# Patient Record
Sex: FEMALE | Race: BLACK OR AFRICAN AMERICAN | NOT HISPANIC OR LATINO
[De-identification: names, ages, dates, MRNs, and addresses within clinical notes are randomized per-mention and may not be internally consistent; named-entity substitution may affect disease eponyms.]

---

## 2017-04-04 ENCOUNTER — OTHER (OUTPATIENT)
Age: 82
End: 2017-04-04

## 2017-04-04 ENCOUNTER — APPOINTMENT (OUTPATIENT)
Dept: SURGERY | Facility: CLINIC | Age: 82
End: 2017-04-04

## 2020-11-27 ENCOUNTER — TRANSCRIPTION ENCOUNTER (OUTPATIENT)
Age: 85
End: 2020-11-27

## 2021-07-27 ENCOUNTER — APPOINTMENT (OUTPATIENT)
Dept: ORTHOPEDIC SURGERY | Facility: CLINIC | Age: 86
End: 2021-07-27
Payer: MEDICARE

## 2021-07-27 VITALS
WEIGHT: 189 LBS | BODY MASS INDEX: 31.49 KG/M2 | SYSTOLIC BLOOD PRESSURE: 150 MMHG | DIASTOLIC BLOOD PRESSURE: 76 MMHG | HEIGHT: 65 IN | HEART RATE: 82 BPM

## 2021-07-27 DIAGNOSIS — Z78.9 OTHER SPECIFIED HEALTH STATUS: ICD-10-CM

## 2021-07-27 DIAGNOSIS — M25.532 PAIN IN LEFT WRIST: ICD-10-CM

## 2021-07-27 DIAGNOSIS — M65.4 RADIAL STYLOID TENOSYNOVITIS [DE QUERVAIN]: ICD-10-CM

## 2021-07-27 PROCEDURE — 99203 OFFICE O/P NEW LOW 30 MIN: CPT | Mod: 25

## 2021-07-27 PROCEDURE — 20550 NJX 1 TENDON SHEATH/LIGAMENT: CPT | Mod: LT

## 2021-07-28 PROBLEM — Z78.9 CURRENT NON-SMOKER: Status: ACTIVE | Noted: 2021-07-28

## 2021-09-11 ENCOUNTER — INPATIENT (INPATIENT)
Facility: HOSPITAL | Age: 86
LOS: 1 days | Discharge: HOME | End: 2021-09-13
Attending: EMERGENCY MEDICINE | Admitting: EMERGENCY MEDICINE
Payer: MEDICARE

## 2021-09-11 VITALS
HEART RATE: 75 BPM | TEMPERATURE: 98 F | WEIGHT: 210.1 LBS | OXYGEN SATURATION: 96 % | HEIGHT: 62 IN | DIASTOLIC BLOOD PRESSURE: 74 MMHG | RESPIRATION RATE: 19 BRPM | SYSTOLIC BLOOD PRESSURE: 160 MMHG

## 2021-09-11 DIAGNOSIS — Z96.611 PRESENCE OF RIGHT ARTIFICIAL SHOULDER JOINT: Chronic | ICD-10-CM

## 2021-09-11 DIAGNOSIS — Z96.653 PRESENCE OF ARTIFICIAL KNEE JOINT, BILATERAL: Chronic | ICD-10-CM

## 2021-09-11 LAB
ALBUMIN SERPL ELPH-MCNC: 3.7 G/DL — SIGNIFICANT CHANGE UP (ref 3.3–5)
ALP SERPL-CCNC: 108 U/L — SIGNIFICANT CHANGE UP (ref 40–120)
ALT FLD-CCNC: 292 U/L — HIGH (ref 12–78)
AMYLASE P1 CFR SERPL: 46 U/L — SIGNIFICANT CHANGE UP (ref 25–115)
ANION GAP SERPL CALC-SCNC: 2 MMOL/L — LOW (ref 5–17)
APTT BLD: 57.2 SEC — HIGH (ref 27.5–35.5)
AST SERPL-CCNC: 227 U/L — HIGH (ref 15–37)
BASOPHILS # BLD AUTO: 0.02 K/UL — SIGNIFICANT CHANGE UP (ref 0–0.2)
BASOPHILS NFR BLD AUTO: 0.4 % — SIGNIFICANT CHANGE UP (ref 0–2)
BILIRUB SERPL-MCNC: 1.1 MG/DL — SIGNIFICANT CHANGE UP (ref 0.2–1.2)
BUN SERPL-MCNC: 10 MG/DL — SIGNIFICANT CHANGE UP (ref 7–23)
CALCIUM SERPL-MCNC: 8.9 MG/DL — SIGNIFICANT CHANGE UP (ref 8.5–10.1)
CHLORIDE SERPL-SCNC: 110 MMOL/L — HIGH (ref 96–108)
CO2 SERPL-SCNC: 32 MMOL/L — HIGH (ref 22–31)
CREAT SERPL-MCNC: 0.72 MG/DL — SIGNIFICANT CHANGE UP (ref 0.5–1.3)
EOSINOPHIL # BLD AUTO: 0.17 K/UL — SIGNIFICANT CHANGE UP (ref 0–0.5)
EOSINOPHIL NFR BLD AUTO: 3.3 % — SIGNIFICANT CHANGE UP (ref 0–6)
GLUCOSE SERPL-MCNC: 86 MG/DL — SIGNIFICANT CHANGE UP (ref 70–99)
HCT VFR BLD CALC: 40.7 % — SIGNIFICANT CHANGE UP (ref 34.5–45)
HGB BLD-MCNC: 13.3 G/DL — SIGNIFICANT CHANGE UP (ref 11.5–15.5)
IMM GRANULOCYTES NFR BLD AUTO: 0.2 % — SIGNIFICANT CHANGE UP (ref 0–1.5)
INR BLD: 1.48 RATIO — HIGH (ref 0.88–1.16)
LIDOCAIN IGE QN: 87 U/L — SIGNIFICANT CHANGE UP (ref 73–393)
LYMPHOCYTES # BLD AUTO: 1.24 K/UL — SIGNIFICANT CHANGE UP (ref 1–3.3)
LYMPHOCYTES # BLD AUTO: 23.8 % — SIGNIFICANT CHANGE UP (ref 13–44)
MCHC RBC-ENTMCNC: 31.7 PG — SIGNIFICANT CHANGE UP (ref 27–34)
MCHC RBC-ENTMCNC: 32.7 GM/DL — SIGNIFICANT CHANGE UP (ref 32–36)
MCV RBC AUTO: 97.1 FL — SIGNIFICANT CHANGE UP (ref 80–100)
MONOCYTES # BLD AUTO: 0.48 K/UL — SIGNIFICANT CHANGE UP (ref 0–0.9)
MONOCYTES NFR BLD AUTO: 9.2 % — SIGNIFICANT CHANGE UP (ref 2–14)
NEUTROPHILS # BLD AUTO: 3.28 K/UL — SIGNIFICANT CHANGE UP (ref 1.8–7.4)
NEUTROPHILS NFR BLD AUTO: 63.1 % — SIGNIFICANT CHANGE UP (ref 43–77)
NRBC # BLD: 0 /100 WBCS — SIGNIFICANT CHANGE UP (ref 0–0)
NT-PROBNP SERPL-SCNC: 825 PG/ML — HIGH (ref 0–450)
PLATELET # BLD AUTO: 202 K/UL — SIGNIFICANT CHANGE UP (ref 150–400)
POTASSIUM SERPL-MCNC: 3.4 MMOL/L — LOW (ref 3.5–5.3)
POTASSIUM SERPL-SCNC: 3.4 MMOL/L — LOW (ref 3.5–5.3)
PROT SERPL-MCNC: 7.4 GM/DL — SIGNIFICANT CHANGE UP (ref 6–8.3)
PROTHROM AB SERPL-ACNC: 16.9 SEC — HIGH (ref 10.6–13.6)
RBC # BLD: 4.19 M/UL — SIGNIFICANT CHANGE UP (ref 3.8–5.2)
RBC # FLD: 13.6 % — SIGNIFICANT CHANGE UP (ref 10.3–14.5)
SODIUM SERPL-SCNC: 144 MMOL/L — SIGNIFICANT CHANGE UP (ref 135–145)
TROPONIN I SERPL-MCNC: 0.04 NG/ML — SIGNIFICANT CHANGE UP (ref 0.01–0.04)
WBC # BLD: 5.2 K/UL — SIGNIFICANT CHANGE UP (ref 3.8–10.5)
WBC # FLD AUTO: 5.2 K/UL — SIGNIFICANT CHANGE UP (ref 3.8–10.5)

## 2021-09-11 PROCEDURE — 99285 EMERGENCY DEPT VISIT HI MDM: CPT | Mod: CS

## 2021-09-11 PROCEDURE — 70450 CT HEAD/BRAIN W/O DYE: CPT | Mod: 26,MA

## 2021-09-11 PROCEDURE — 74177 CT ABD & PELVIS W/CONTRAST: CPT | Mod: 26,MA

## 2021-09-11 PROCEDURE — 93010 ELECTROCARDIOGRAM REPORT: CPT

## 2021-09-11 PROCEDURE — 93970 EXTREMITY STUDY: CPT | Mod: 26

## 2021-09-11 PROCEDURE — 99223 1ST HOSP IP/OBS HIGH 75: CPT

## 2021-09-11 PROCEDURE — 71275 CT ANGIOGRAPHY CHEST: CPT | Mod: 26,MA

## 2021-09-11 NOTE — ED PROVIDER NOTE - CONSTITUTIONAL, MLM
Well appearing, awake, alert, oriented to person, place, time/situation and in no apparent distress. Speaking in clear full sentences no nasal flaring no shoulders retractions no diaphoresis not holding her head/chest/abdomen normal...

## 2021-09-11 NOTE — ED ADULT NURSE REASSESSMENT NOTE - NS ED NURSE REASSESS COMMENT FT1
pt accompanied by daughter. pt cleaned and bed linen changed, pt laying comfortably in stretcher, on cardiac monitor. no acute distress noted, will continue to monitor

## 2021-09-11 NOTE — ED PROVIDER NOTE - NSICDXPASTMEDICALHX_GEN_ALL_CORE_FT
PAST MEDICAL HISTORY:  Atrial fibrillation     HTN (hypertension)     Hypercholesteremia     Lesion of lip     Osteoarthritis

## 2021-09-11 NOTE — ED ADULT NURSE NOTE - ED STAT RN HANDOFF DETAILS 2
Report endorsed to oncoming RN diamante. Safety checks compld this shift/Safety rounds completed hourly.  IV sites checked Q2+remains WDL. Meds given as ord with no s/s of adverse RXNs. Fall +skin precs in place. Any issues endorsed to oncoming RN for follow up. pending CT

## 2021-09-11 NOTE — ED PROVIDER NOTE - CARE PLAN
1 Principal Discharge DX:	Abdominal pain   Principal Discharge DX:	Hypoxia  Secondary Diagnosis:	Exertional chest pain

## 2021-09-11 NOTE — ED PROVIDER NOTE - ABDOMINAL TENDER
No Nielson's sign/left upper quadrant/right upper quadrant/left lower quadrant/right lower quadrant/epigastric

## 2021-09-11 NOTE — ED ADULT NURSE NOTE - OBJECTIVE STATEMENT
8Pt presents to ED c/o HA, CP, SOB, abdominal pain, bilateral pedal edema X4 days. +wheezing. SpO2 96% on room air. Cardiac and spo2 monitoring in place. Pt denies fever/chills, N/V/D, palpitations, dizziness. Pt denies sick contacts.

## 2021-09-11 NOTE — ED PROVIDER NOTE - OBJECTIVE STATEMENT
89 years old female here c/o headache, mid chest pain, sob and diffused abd pain intermittently for 4 days Pt has a hx of atrial fib takes Pradaxa. 89 years old female here c/o headache, mid chest pain, sob and diffused abd pain intermittently for 4 days Pt has a hx of atrial fib takes Pradaxa. Pt also c/o bilateral legs swelling and pain. Pt had Moderna covid vaccines. Pt denies recent hx of trauma, dizziness, blurred visions, light sensitivities, focal/distal weakness or numbness, neck/back pain, nausea, vomiting, fever, chills, dysuria or irregular bowel movements.

## 2021-09-11 NOTE — ED ADULT NURSE NOTE - ED STAT RN HANDOFF DETAILS
Report received from CHRISTIANO watkins. Safety checks compld this shift/Safety rounds completed hourly.  IV sites checked Q2+remains WDL. Meds given as ord with no s/s of adverse RXNs. Fall +skin precs in place. on cardiac monitor, daughter at bedside, pt denies pain, no complaints from pt at this time. no acute distress noted, will continue to monitor

## 2021-09-11 NOTE — ED PROVIDER NOTE - EYES, MLM
Clear bilaterally, pupils equal, round and reactive to light. No photophobia bilaterally no sclera ictersu bilaterally

## 2021-09-11 NOTE — ED PROVIDER NOTE - CLINICAL SUMMARY MEDICAL DECISION MAKING FREE TEXT BOX
hx, exam, ekg. pt's ct head/ cta of chest/ ct of abd/pelvis, and care are signed out to the next team

## 2021-09-11 NOTE — ED PROVIDER NOTE - NSICDXPASTSURGICALHX_GEN_ALL_CORE_FT
PAST SURGICAL HISTORY:  History of right shoulder replacement 2013    Status post total bilateral knee replacement 1999

## 2021-09-12 DIAGNOSIS — Z29.9 ENCOUNTER FOR PROPHYLACTIC MEASURES, UNSPECIFIED: ICD-10-CM

## 2021-09-12 DIAGNOSIS — R07.9 CHEST PAIN, UNSPECIFIED: ICD-10-CM

## 2021-09-12 DIAGNOSIS — I10 ESSENTIAL (PRIMARY) HYPERTENSION: ICD-10-CM

## 2021-09-12 DIAGNOSIS — R09.89 OTHER SPECIFIED SYMPTOMS AND SIGNS INVOLVING THE CIRCULATORY AND RESPIRATORY SYSTEMS: ICD-10-CM

## 2021-09-12 DIAGNOSIS — R10.9 UNSPECIFIED ABDOMINAL PAIN: ICD-10-CM

## 2021-09-12 DIAGNOSIS — I48.91 UNSPECIFIED ATRIAL FIBRILLATION: ICD-10-CM

## 2021-09-12 LAB
ALBUMIN SERPL ELPH-MCNC: 3.3 G/DL — SIGNIFICANT CHANGE UP (ref 3.3–5)
ALP SERPL-CCNC: 90 U/L — SIGNIFICANT CHANGE UP (ref 40–120)
ALT FLD-CCNC: 259 U/L — HIGH (ref 12–78)
ANION GAP SERPL CALC-SCNC: 6 MMOL/L — SIGNIFICANT CHANGE UP (ref 5–17)
AST SERPL-CCNC: 159 U/L — HIGH (ref 15–37)
BILIRUB SERPL-MCNC: 1.2 MG/DL — SIGNIFICANT CHANGE UP (ref 0.2–1.2)
BUN SERPL-MCNC: 7 MG/DL — SIGNIFICANT CHANGE UP (ref 7–23)
CALCIUM SERPL-MCNC: 8.7 MG/DL — SIGNIFICANT CHANGE UP (ref 8.5–10.1)
CHLORIDE SERPL-SCNC: 107 MMOL/L — SIGNIFICANT CHANGE UP (ref 96–108)
CK MB BLD-MCNC: 1.4 % — SIGNIFICANT CHANGE UP (ref 0–3.5)
CK MB CFR SERPL CALC: 4.6 NG/ML — HIGH (ref 0.5–3.6)
CK SERPL-CCNC: 322 U/L — HIGH (ref 26–192)
CO2 SERPL-SCNC: 26 MMOL/L — SIGNIFICANT CHANGE UP (ref 22–31)
CREAT SERPL-MCNC: 0.61 MG/DL — SIGNIFICANT CHANGE UP (ref 0.5–1.3)
FLUAV AG NPH QL: SIGNIFICANT CHANGE UP
FLUBV AG NPH QL: SIGNIFICANT CHANGE UP
GLUCOSE SERPL-MCNC: 89 MG/DL — SIGNIFICANT CHANGE UP (ref 70–99)
HAV IGM SER-ACNC: SIGNIFICANT CHANGE UP
HBV CORE IGM SER-ACNC: SIGNIFICANT CHANGE UP
HBV SURFACE AG SER-ACNC: SIGNIFICANT CHANGE UP
HCT VFR BLD CALC: 44.6 % — SIGNIFICANT CHANGE UP (ref 34.5–45)
HCV AB S/CO SERPL IA: 0.14 S/CO — SIGNIFICANT CHANGE UP (ref 0–0.99)
HCV AB SERPL-IMP: SIGNIFICANT CHANGE UP
HGB BLD-MCNC: 14.6 G/DL — SIGNIFICANT CHANGE UP (ref 11.5–15.5)
MCHC RBC-ENTMCNC: 32.2 PG — SIGNIFICANT CHANGE UP (ref 27–34)
MCHC RBC-ENTMCNC: 32.7 GM/DL — SIGNIFICANT CHANGE UP (ref 32–36)
MCV RBC AUTO: 98.5 FL — SIGNIFICANT CHANGE UP (ref 80–100)
NRBC # BLD: 0 /100 WBCS — SIGNIFICANT CHANGE UP (ref 0–0)
PLATELET # BLD AUTO: 200 K/UL — SIGNIFICANT CHANGE UP (ref 150–400)
POTASSIUM SERPL-MCNC: 3.5 MMOL/L — SIGNIFICANT CHANGE UP (ref 3.5–5.3)
POTASSIUM SERPL-SCNC: 3.5 MMOL/L — SIGNIFICANT CHANGE UP (ref 3.5–5.3)
PROT SERPL-MCNC: 7.1 GM/DL — SIGNIFICANT CHANGE UP (ref 6–8.3)
RBC # BLD: 4.53 M/UL — SIGNIFICANT CHANGE UP (ref 3.8–5.2)
RBC # FLD: 13.6 % — SIGNIFICANT CHANGE UP (ref 10.3–14.5)
SARS-COV-2 RNA SPEC QL NAA+PROBE: SIGNIFICANT CHANGE UP
SODIUM SERPL-SCNC: 139 MMOL/L — SIGNIFICANT CHANGE UP (ref 135–145)
TROPONIN I SERPL-MCNC: 0.06 NG/ML — HIGH (ref 0.01–0.04)
TROPONIN I SERPL-MCNC: 0.08 NG/ML — HIGH (ref 0.01–0.04)
WBC # BLD: 5.91 K/UL — SIGNIFICANT CHANGE UP (ref 3.8–10.5)
WBC # FLD AUTO: 5.91 K/UL — SIGNIFICANT CHANGE UP (ref 3.8–10.5)

## 2021-09-12 PROCEDURE — 99232 SBSQ HOSP IP/OBS MODERATE 35: CPT

## 2021-09-12 PROCEDURE — 76700 US EXAM ABDOM COMPLETE: CPT | Mod: 26

## 2021-09-12 RX ORDER — PANTOPRAZOLE SODIUM 20 MG/1
40 TABLET, DELAYED RELEASE ORAL
Refills: 0 | Status: DISCONTINUED | OUTPATIENT
Start: 2021-09-12 | End: 2021-09-13

## 2021-09-12 RX ORDER — AMLODIPINE BESYLATE 2.5 MG/1
5 TABLET ORAL DAILY
Refills: 0 | Status: DISCONTINUED | OUTPATIENT
Start: 2021-09-12 | End: 2021-09-13

## 2021-09-12 RX ORDER — ATORVASTATIN CALCIUM 80 MG/1
40 TABLET, FILM COATED ORAL AT BEDTIME
Refills: 0 | Status: DISCONTINUED | OUTPATIENT
Start: 2021-09-12 | End: 2021-09-13

## 2021-09-12 RX ORDER — FUROSEMIDE 40 MG
20 TABLET ORAL DAILY
Refills: 0 | Status: DISCONTINUED | OUTPATIENT
Start: 2021-09-12 | End: 2021-09-13

## 2021-09-12 RX ORDER — DABIGATRAN ETEXILATE MESYLATE 150 MG/1
150 CAPSULE ORAL EVERY 12 HOURS
Refills: 0 | Status: DISCONTINUED | OUTPATIENT
Start: 2021-09-12 | End: 2021-09-13

## 2021-09-12 RX ORDER — INFLUENZA VIRUS VACCINE 15; 15; 15; 15 UG/.5ML; UG/.5ML; UG/.5ML; UG/.5ML
0.5 SUSPENSION INTRAMUSCULAR ONCE
Refills: 0 | Status: COMPLETED | OUTPATIENT
Start: 2021-09-12 | End: 2021-09-12

## 2021-09-12 RX ORDER — POTASSIUM CHLORIDE 20 MEQ
40 PACKET (EA) ORAL ONCE
Refills: 0 | Status: COMPLETED | OUTPATIENT
Start: 2021-09-12 | End: 2021-09-12

## 2021-09-12 RX ORDER — ASPIRIN/CALCIUM CARB/MAGNESIUM 324 MG
81 TABLET ORAL DAILY
Refills: 0 | Status: DISCONTINUED | OUTPATIENT
Start: 2021-09-12 | End: 2021-09-13

## 2021-09-12 RX ORDER — METOPROLOL TARTRATE 50 MG
25 TABLET ORAL
Refills: 0 | Status: DISCONTINUED | OUTPATIENT
Start: 2021-09-12 | End: 2021-09-13

## 2021-09-12 RX ADMIN — ATORVASTATIN CALCIUM 40 MILLIGRAM(S): 80 TABLET, FILM COATED ORAL at 22:30

## 2021-09-12 RX ADMIN — AMLODIPINE BESYLATE 5 MILLIGRAM(S): 2.5 TABLET ORAL at 05:38

## 2021-09-12 RX ADMIN — DABIGATRAN ETEXILATE MESYLATE 150 MILLIGRAM(S): 150 CAPSULE ORAL at 17:20

## 2021-09-12 RX ADMIN — Medication 40 MILLIEQUIVALENT(S): at 05:38

## 2021-09-12 RX ADMIN — Medication 20 MILLIGRAM(S): at 05:45

## 2021-09-12 RX ADMIN — Medication 81 MILLIGRAM(S): at 11:53

## 2021-09-12 RX ADMIN — DABIGATRAN ETEXILATE MESYLATE 150 MILLIGRAM(S): 150 CAPSULE ORAL at 05:45

## 2021-09-12 RX ADMIN — Medication 25 MILLIGRAM(S): at 05:38

## 2021-09-12 NOTE — CONSULT NOTE ADULT - SUBJECTIVE AND OBJECTIVE BOX
CHIEF COMPLAINT:  Patient is a 89y old  Female who presents with a chief complaint of     HPI:  Patient is a 88 y/o male with PMH of HTN, HLD, osteoarthritis, Afib on Pradaxa p/w chest pain and shortness of breath for 3 days. Pt reports that she has been having non exertion, intermittent,  sub-sternal, non radiating, pressure like chest pain associated with shortness of breath, palpitations, b/l leg swelling. She also reports abdominal pain associated with low appetite, urinary frequency,  denies any nausea, vomiting, fever, chills, urinary discomfort or burning micturition.   In ED, patient's initial VS were stable, saturating 96% on room air.    ALLERGIES:  No Known Allergies        Home Medications:  amLODIPine 5 mg oral tablet: 1 tab(s) orally once a day (12 Sep 2021 04:38)  atorvastatin 40 mg oral tablet: 1 tab(s) orally once a day (12 Sep 2021 04:38)  furosemide 20 mg oral tablet: 1 tab(s) orally once a day (12 Sep 2021 04:38)  Pradaxa 150 mg oral capsule: 1 cap(s) orally 2 times a day (12 Sep 2021 04:38)    PAST MEDICAL & SURGICAL HISTORY:  HTN (hypertension)    Hypercholesteremia    Osteoarthritis    Lesion of lip    Atrial fibrillation    Status post total bilateral knee replacement  1999    History of right shoulder replacement  2013          FAMILY HISTORY:  n/a    SOCIAL HISTORY:  no tobacco use    REVIEW OF SYSTEMS:  General:  No wt loss, fevers, chills, night sweats  Eyes:  Good vision, no reported pain  ENT:  No sore throat, pain, runny nose, dysphagia  CV:  No pain, palpitations, hypo/hypertension  Resp:  No dyspnea, cough, tachypnea, wheezing  GI:  No pain, nausea, vomiting, diarrhea, constipation  :  No pain, bleeding, incontinence, nocturia  Muscle:  No pain, weakness  Breast:  No pain, abscess, mass, discharge  Neuro:  No weakness, tingling, memory problems  Psych:  No fatigue, insomnia, mood problems, depression  Endocrine:  No polyuria, polydipsia, cold/heat intolerance  Heme:  No petechiae, ecchymosis, easy bruisability  Skin:  No rash, edema    PHYSICAL EXAM:  Vital Signs:  Vital Signs Last 24 Hrs  T(C): 36.7 (12 Sep 2021 10:10), Max: 36.8 (11 Sep 2021 20:08)  T(F): 98 (12 Sep 2021 10:10), Max: 98.2 (11 Sep 2021 20:08)  HR: 72 (12 Sep 2021 10:10) (72 - 145)  BP: 115/76 (12 Sep 2021 10:10) (115/76 - 160/93)  RR: 19 (12 Sep 2021 10:10) (18 - 28)  SpO2: 99% (12 Sep 2021 10:10) (92% - 99%)  I&O's Summary    12 Sep 2021 07:01  -  12 Sep 2021 14:22  --------------------------------------------------------  IN: 380 mL / OUT: 0 mL / NET: 380 mL      I&O's Detail    12 Sep 2021 07:01  -  12 Sep 2021 14:22  --------------------------------------------------------  IN:    Oral Fluid: 380 mL  Total IN: 380 mL    OUT:  Total OUT: 0 mL    Total NET: 380 mL          Tele: AF    Constitutional: well developed, normal appearance, well groomed, well nourished, no deformities and no acute distress.   Eyes: the conjunctiva exhibited no abnormalities and the eyelids demonstrated no xanthelasmas.   HEENT: normal oral mucosa, no oral pallor and no oral cyanosis.   Neck: normal jugular venous A waves present, normal jugular venous V waves present and no jugular venous vargas A waves.   Pulmonary: no respiratory distress, normal respiratory rhythm and effort, no accessory muscle use and lungs were clear to auscultation bilaterally.   Cardiovascular: heart rate and rhythm were normal, normal S1 and S2 and no murmur, gallop, rub, heave or thrill are present.   Abdomen: soft, non-tender  Musculoskeletal: the gait could not be assessed.  Extremities: no clubbing of the fingernails, no localized cyanosis, no petechial hemorrhages and no ischemic changes.   Skin: normal skin color and pigmentation, no rash, no venous stasis, no skin lesions, no skin ulcer and no xanthoma was observed.       LABORATORY:                          14.6   5.91  )-----------( 200      ( 12 Sep 2021 09:40 )             44.6     09-12    139  |  107  |  7   ----------------------------<  89  3.5   |  26  |  0.61    Ca    8.7      12 Sep 2021 08:51    TPro  7.1  /  Alb  3.3  /  TBili  1.2  /  DBili  x   /  AST  159<H>  /  ALT  259<H>  /  AlkPhos  90  09-12      CARDIAC MARKERS ( 12 Sep 2021 08:51 )  .076 ng/mL / x     / 322 U/L / x     / 4.6 ng/mL  CARDIAC MARKERS ( 11 Sep 2021 19:20 )  .044 ng/mL / x     / x     / x     / x            LIVER FUNCTIONS - ( 12 Sep 2021 08:51 )  Alb: 3.3 g/dL / Pro: 7.1 gm/dL / ALK PHOS: 90 U/L / ALT: 259 U/L / AST: 159 U/L / GGT: x           PT/INR - ( 11 Sep 2021 19:21 )   PT: 16.9 sec;   INR: 1.48 ratio         PTT - ( 11 Sep 2021 19:21 )  PTT:57.2 sec    BNPSerum Pro-Brain Natriuretic Peptide: 825 pg/mL (09-11-21 @ 19:20)      IMAGING:  < from: 12 Lead ECG (09.11.21 @ 16:18) >  Atrial fibrillation  Left axis deviation  Septal infarct , age undetermined  Abnormal ECG  No previous ECGs available    < end of copied text >    < from: CT Angio Chest PE Protocol w/ IV Cont (09.11.21 @ 21:31) >    No evidence of pulmonary embolism or acute aortic syndrome.    No acute intra-abdominal or pelvic disease.    < end of copied text >    < from: US Abdomen Complete (US Abdomen Complete .) (09.12.21 @ 10:54) >  IMPRESSION:  Mildly distended gallbladder. No definite gallstones delineated.    < end of copied text >      ASSESSMENT:  Patient is a 88 y/o male with PMH of HTN, HLD, osteoarthritis, Afib on Pradaxa p/w chest pain and shortness of breath for 3 days. Pt reports that she has been having non exertion, intermittent,  sub-sternal, non radiating, pressure like chest pain associated with shortness of breath, palpitations, b/l leg swelling. She also reports abdominal pain associated with low appetite, urinary frequency,  denies any nausea, vomiting, fever, chills, urinary discomfort or burning micturition.   In ED, patient's initial VS were stable, saturating 96% on room air.    PLAN:       amLODIPine   Tablet 5 milliGRAM(s) Oral daily  aspirin enteric coated 81 milliGRAM(s) Oral daily  atorvastatin 40 milliGRAM(s) Oral at bedtime  dabigatran 150 milliGRAM(s) Oral every 12 hours  furosemide    Tablet 20 milliGRAM(s) Oral daily  influenza   Vaccine 0.5 milliLiter(s) IntraMuscular once  metoprolol tartrate 25 milliGRAM(s) Oral two times a day  pantoprazole    Tablet 40 milliGRAM(s) Oral before breakfast    c/w DOAC, c/wbb  echo pending    Greg Montes De Oca MD, FACC, FASNICOLE, FASNC, FACP  Director, Heart Failure Services  [facility]  , Department of Cardiology  [facility]     CHIEF COMPLAINT:  Patient is a 89y old  Female who presents with a chief complaint of     HPI:  Patient is a 89 y/o with HTN, HLD, osteoarthritis, Afib on Pradaxa p/w chest pain and shortness of breath for 3 days. Pt reports that she has been having non exertion, intermittent,  sub-sternal, non radiating, pressure like chest pain associated with shortness of breath, palpitations, b/l leg swelling. She also reports abdominal pain associated with low appetite, urinary frequency,  denies any nausea, vomiting, fever, chills, urinary discomfort or burning micturition.   In ED, patient's initial VS were stable, saturating 96% on room air.    ALLERGIES:  No Known Allergies        Home Medications:  amLODIPine 5 mg oral tablet: 1 tab(s) orally once a day (12 Sep 2021 04:38)  atorvastatin 40 mg oral tablet: 1 tab(s) orally once a day (12 Sep 2021 04:38)  furosemide 20 mg oral tablet: 1 tab(s) orally once a day (12 Sep 2021 04:38)  Pradaxa 150 mg oral capsule: 1 cap(s) orally 2 times a day (12 Sep 2021 04:38)    PAST MEDICAL & SURGICAL HISTORY:  HTN (hypertension)    Hypercholesteremia    Osteoarthritis    Lesion of lip    Atrial fibrillation    Status post total bilateral knee replacement  1999    History of right shoulder replacement  2013          FAMILY HISTORY:  n/a    SOCIAL HISTORY:  no tobacco use    REVIEW OF SYSTEMS:  unable    PHYSICAL EXAM:  Vital Signs:  Vital Signs Last 24 Hrs  T(C): 36.7 (12 Sep 2021 10:10), Max: 36.8 (11 Sep 2021 20:08)  T(F): 98 (12 Sep 2021 10:10), Max: 98.2 (11 Sep 2021 20:08)  HR: 72 (12 Sep 2021 10:10) (72 - 145)  BP: 115/76 (12 Sep 2021 10:10) (115/76 - 160/93)  RR: 19 (12 Sep 2021 10:10) (18 - 28)  SpO2: 99% (12 Sep 2021 10:10) (92% - 99%)  I&O's Summary    12 Sep 2021 07:01  -  12 Sep 2021 14:22  --------------------------------------------------------  IN: 380 mL / OUT: 0 mL / NET: 380 mL      I&O's Detail    12 Sep 2021 07:01  -  12 Sep 2021 14:22  --------------------------------------------------------  IN:    Oral Fluid: 380 mL  Total IN: 380 mL    OUT:  Total OUT: 0 mL    Total NET: 380 mL      Tele: AF    Constitutional: well developed, normal appearance, well groomed, well nourished, no deformities and no acute distress.   Eyes: the conjunctiva exhibited no abnormalities and the eyelids demonstrated no xanthelasmas.   HEENT: normal oral mucosa, no oral pallor and no oral cyanosis.   Neck: normal jugular venous A waves present, normal jugular venous V waves present and no jugular venous vargas A waves.   Pulmonary: no respiratory distress, normal respiratory rhythm and effort, no accessory muscle use and lungs were clear to auscultation bilaterally.   Cardiovascular: heart rate and rhythm were normal, normal S1 and S2 and no murmur.  Abdomen: soft, non-tender  Musculoskeletal: the gait could not be assessed.  Extremities: no clubbing of the fingernails, no localized cyanosis, no petechial hemorrhages and no ischemic changes.   Skin: normal skin color and pigmentation, no rash, no venous stasis, no skin lesions, no skin ulcer and no xanthoma was observed.       LABORATORY:                          14.6   5.91  )-----------( 200      ( 12 Sep 2021 09:40 )             44.6     09-12    139  |  107  |  7   ----------------------------<  89  3.5   |  26  |  0.61    Ca    8.7      12 Sep 2021 08:51    TPro  7.1  /  Alb  3.3  /  TBili  1.2  /  DBili  x   /  AST  159<H>  /  ALT  259<H>  /  AlkPhos  90  09-12      CARDIAC MARKERS ( 12 Sep 2021 08:51 )  .076 ng/mL / x     / 322 U/L / x     / 4.6 ng/mL  CARDIAC MARKERS ( 11 Sep 2021 19:20 )  .044 ng/mL / x     / x     / x     / x            LIVER FUNCTIONS - ( 12 Sep 2021 08:51 )  Alb: 3.3 g/dL / Pro: 7.1 gm/dL / ALK PHOS: 90 U/L / ALT: 259 U/L / AST: 159 U/L / GGT: x           PT/INR - ( 11 Sep 2021 19:21 )   PT: 16.9 sec;   INR: 1.48 ratio         PTT - ( 11 Sep 2021 19:21 )  PTT:57.2 sec    BNPSerum Pro-Brain Natriuretic Peptide: 825 pg/mL (09-11-21 @ 19:20)      IMAGING:  < from: 12 Lead ECG (09.11.21 @ 16:18) >  Atrial fibrillation  Left axis deviation  Septal infarct , age undetermined  Abnormal ECG  No previous ECGs available    < end of copied text >    < from: CT Angio Chest PE Protocol w/ IV Cont (09.11.21 @ 21:31) >    No evidence of pulmonary embolism or acute aortic syndrome.    No acute intra-abdominal or pelvic disease.    < end of copied text >    < from: US Abdomen Complete (US Abdomen Complete .) (09.12.21 @ 10:54) >  IMPRESSION:  Mildly distended gallbladder. No definite gallstones delineated.    < end of copied text >      < from: TTE Echo Complete w/o Contrast w/ Doppler (09.12.21 @ 11:03) >  Summary:   1. Left ventricular ejection fraction, by visual estimation, is 50 to 55%.   2. Technically fair study.   3. Normal global left ventricular systolic function.   4. Elevated left ventricular end-diastolic pressure.   5. Normal left ventricular internal cavity size.   6.Spectral Doppler shows pseudonormal pattern of left ventricular myocardial filling (Grade II diastolic dysfunction).   7. There is mild concentric left ventricular hypertrophy.   8. There is no evidence of pericardial effusion.   9. Mild thickening and calcification of the anterior and posterior mitral valve leaflets.  10. Mild to moderate mitral valve regurgitation.  11. Moderate-severe tricuspid regurgitation.  12. Moderate aortic regurgitation.  13. Sclerotic aortic valve with normal opening.  14. Mild pulmonic valve regurgitation.  15. Estimated pulmonary artery systolic pressure is 40.6 mmHg assuming a right atrial pressure of 5 mmHg, which is consistent with mild pulmonary hypertension.    < end of copied text >    ASSESSMENT:  Patient is a 89 y/o male with PMH of HTN, HLD, osteoarthritis, Afib on Pradaxa p/w chest pain and shortness of breath for 3 days. Pt reports that she has been having non exertion, intermittent,  sub-sternal, non radiating, pressure like chest pain associated with shortness of breath, palpitations, b/l leg swelling. She also reports abdominal pain associated with low appetite, urinary frequency,  denies any nausea, vomiting, fever, chills, urinary discomfort or burning micturition.   In ED, patient's initial VS were stable, saturating 96% on room air.    PLAN:       amLODIPine   Tablet 5 milliGRAM(s) Oral daily  aspirin enteric coated 81 milliGRAM(s) Oral daily  atorvastatin 40 milliGRAM(s) Oral at bedtime  dabigatran 150 milliGRAM(s) Oral every 12 hours  furosemide    Tablet 20 milliGRAM(s) Oral daily  metoprolol tartrate 25 milliGRAM(s) Oral two times a day  pantoprazole    Tablet 40 milliGRAM(s) Oral before breakfast    c/w DOAC, c/w bb      Greg Montes De Oca MD, FACC, FASE, FASNC, FACP  Director, Heart Failure Services  [facility]  , Department of Cardiology  [facility]     CHIEF COMPLAINT:  Patient is a 89y old  Female who presents with a chief complaint of     HPI:  Patient is a 88 y/o with HTN, HLD, osteoarthritis, Afib on Pradaxa p/w chest pain and shortness of breath for 3 days. Pt reports that she has been having non exertion, intermittent,  sub-sternal, non radiating, pressure like chest pain associated with shortness of breath, palpitations, b/l leg swelling. She also reports abdominal pain associated with low appetite, urinary frequency,  denies any nausea, vomiting, fever, chills, urinary discomfort or burning micturition.   In ED, patient's initial VS were stable, saturating 96% on room air.    ALLERGIES:  No Known Allergies        Home Medications:  amLODIPine 5 mg oral tablet: 1 tab(s) orally once a day (12 Sep 2021 04:38)  atorvastatin 40 mg oral tablet: 1 tab(s) orally once a day (12 Sep 2021 04:38)  furosemide 20 mg oral tablet: 1 tab(s) orally once a day (12 Sep 2021 04:38)  Pradaxa 150 mg oral capsule: 1 cap(s) orally 2 times a day (12 Sep 2021 04:38)    PAST MEDICAL & SURGICAL HISTORY:  HTN (hypertension)    Hypercholesteremia    Osteoarthritis    Lesion of lip    Atrial fibrillation    Status post total bilateral knee replacement  1999    History of right shoulder replacement  2013          FAMILY HISTORY:  n/a    SOCIAL HISTORY:  no tobacco use    REVIEW OF SYSTEMS:  unable    PHYSICAL EXAM:  Vital Signs:  Vital Signs Last 24 Hrs  T(C): 36.7 (12 Sep 2021 10:10), Max: 36.8 (11 Sep 2021 20:08)  T(F): 98 (12 Sep 2021 10:10), Max: 98.2 (11 Sep 2021 20:08)  HR: 72 (12 Sep 2021 10:10) (72 - 145)  BP: 115/76 (12 Sep 2021 10:10) (115/76 - 160/93)  RR: 19 (12 Sep 2021 10:10) (18 - 28)  SpO2: 99% (12 Sep 2021 10:10) (92% - 99%)  I&O's Summary    12 Sep 2021 07:01  -  12 Sep 2021 14:22  --------------------------------------------------------  IN: 380 mL / OUT: 0 mL / NET: 380 mL      I&O's Detail    12 Sep 2021 07:01  -  12 Sep 2021 14:22  --------------------------------------------------------  IN:    Oral Fluid: 380 mL  Total IN: 380 mL    OUT:  Total OUT: 0 mL    Total NET: 380 mL      Tele: AF    Constitutional: well developed, normal appearance, well groomed, well nourished, no deformities and no acute distress.   Eyes: the conjunctiva exhibited no abnormalities and the eyelids demonstrated no xanthelasmas.   HEENT: normal oral mucosa, no oral pallor and no oral cyanosis.   Neck: normal jugular venous A waves present, normal jugular venous V waves present and no jugular venous vargas A waves.   Pulmonary: no respiratory distress, normal respiratory rhythm and effort, no accessory muscle use and lungs were clear to auscultation bilaterally.   Cardiovascular: heart rate and rhythm were normal, normal S1 and S2 and no murmur.  Abdomen: soft, non-tender  Musculoskeletal: the gait could not be assessed.  Extremities: no clubbing of the fingernails, no localized cyanosis, no petechial hemorrhages and no ischemic changes.   Skin: normal skin color and pigmentation, no rash, no venous stasis, no skin lesions, no skin ulcer and no xanthoma was observed.       LABORATORY:                          14.6   5.91  )-----------( 200      ( 12 Sep 2021 09:40 )             44.6     09-12    139  |  107  |  7   ----------------------------<  89  3.5   |  26  |  0.61    Ca    8.7      12 Sep 2021 08:51    TPro  7.1  /  Alb  3.3  /  TBili  1.2  /  DBili  x   /  AST  159<H>  /  ALT  259<H>  /  AlkPhos  90  09-12      CARDIAC MARKERS ( 12 Sep 2021 08:51 )  .076 ng/mL / x     / 322 U/L / x     / 4.6 ng/mL  CARDIAC MARKERS ( 11 Sep 2021 19:20 )  .044 ng/mL / x     / x     / x     / x            LIVER FUNCTIONS - ( 12 Sep 2021 08:51 )  Alb: 3.3 g/dL / Pro: 7.1 gm/dL / ALK PHOS: 90 U/L / ALT: 259 U/L / AST: 159 U/L / GGT: x           PT/INR - ( 11 Sep 2021 19:21 )   PT: 16.9 sec;   INR: 1.48 ratio         PTT - ( 11 Sep 2021 19:21 )  PTT:57.2 sec    BNPSerum Pro-Brain Natriuretic Peptide: 825 pg/mL (09-11-21 @ 19:20)      IMAGING:  < from: 12 Lead ECG (09.11.21 @ 16:18) >  Atrial fibrillation  Left axis deviation  Septal infarct , age undetermined  Abnormal ECG  No previous ECGs available    < end of copied text >    < from: CT Angio Chest PE Protocol w/ IV Cont (09.11.21 @ 21:31) >    No evidence of pulmonary embolism or acute aortic syndrome.    No acute intra-abdominal or pelvic disease.    < end of copied text >    < from: US Abdomen Complete (US Abdomen Complete .) (09.12.21 @ 10:54) >  IMPRESSION:  Mildly distended gallbladder. No definite gallstones delineated.    < end of copied text >      < from: TTE Echo Complete w/o Contrast w/ Doppler (09.12.21 @ 11:03) >  Summary:   1. Left ventricular ejection fraction, by visual estimation, is 50 to 55%.   2. Technically fair study.   3. Normal global left ventricular systolic function.   4. Elevated left ventricular end-diastolic pressure.   5. Normal left ventricular internal cavity size.   6.Spectral Doppler shows pseudonormal pattern of left ventricular myocardial filling (Grade II diastolic dysfunction).   7. There is mild concentric left ventricular hypertrophy.   8. There is no evidence of pericardial effusion.   9. Mild thickening and calcification of the anterior and posterior mitral valve leaflets.  10. Mild to moderate mitral valve regurgitation.  11. Moderate-severe tricuspid regurgitation.  12. Moderate aortic regurgitation.  13. Sclerotic aortic valve with normal opening.  14. Mild pulmonic valve regurgitation.  15. Estimated pulmonary artery systolic pressure is 40.6 mmHg assuming a right atrial pressure of 5 mmHg, which is consistent with mild pulmonary hypertension.    < end of copied text >    ASSESSMENT:  Patient is a 88 y/o male with PMH of HTN, HLD, osteoarthritis, Afib on Pradaxa p/w chest pain and shortness of breath for 3 days. Pt reports that she has been having non exertion, intermittent,  sub-sternal, non radiating, pressure like chest pain associated with shortness of breath, palpitations, b/l leg swelling. She also reports abdominal pain associated with low appetite, urinary frequency,  denies any nausea, vomiting, fever, chills, urinary discomfort or burning micturition.   In ED, patient's initial VS were stable, saturating 96% on room air.    PLAN:       amLODIPine   Tablet 5 milliGRAM(s) Oral daily  aspirin enteric coated 81 milliGRAM(s) Oral daily  atorvastatin 40 milliGRAM(s) Oral at bedtime  dabigatran 150 milliGRAM(s) Oral every 12 hours  furosemide    Tablet 20 milliGRAM(s) Oral daily  metoprolol tartrate 25 milliGRAM(s) Oral two times a day  pantoprazole    Tablet 40 milliGRAM(s) Oral before breakfast    c/w DOAC, c/w lopressor.  troponin release likely demand ischemia.  no further cardiac work up needed at present.  signing off; please call back if needed.    Greg Montes De Oca MD, FACC, SHIVANI CAMPOS, FACP  Director, Heart Failure Services  [facility]  , Department of Cardiology  [facility]

## 2021-09-12 NOTE — H&P ADULT - PROBLEM SELECTOR PLAN 2
-Pt p/w abdominal pain, no Nausea vomiting.   -P/E: epigastric and RUQ tenderness   -CT abdomen: No evidence of pulmonary embolism or acute aortic syndrome.  No acute intra-abdominal or pelvic disease.  -LFTs noted to be elevated, AST//292   -Follow hepatitis panel, abdominal US for further eval   -Start Protonix

## 2021-09-12 NOTE — H&P ADULT - NSHPPHYSICALEXAM_GEN_ALL_CORE
Vital Signs Last 24 Hrs  T(C): 36.7 (12 Sep 2021 02:53), Max: 36.8 (11 Sep 2021 20:08)  T(F): 98 (12 Sep 2021 02:53), Max: 98.2 (11 Sep 2021 20:08)  HR: 92 (12 Sep 2021 02:53) (75 - 106)  BP: 160/93 (12 Sep 2021 02:53) (122/89 - 160/93)  RR: 25 (12 Sep 2021 02:53) (18 - 28)  SpO2: 92% (12 Sep 2021 02:53) (92% - 98%)    CONSTITUTIONAL: Well appearing, well nourished, awake, alert and in no apparent distress  ENMT: Airway patent, Nasal mucosa clear. Mouth with normal mucosa.   EYES: Clear bilaterally, pupils equal, round and reactive to light.   CARDIAC: Normal rate, regular rhythm.  Heart sounds S1, S2.  No murmurs, rubs or gallops   RESPIRATORY: Breath sounds clear and equal bilaterally. No wheezes, rales or rhonchi  ABDOMEN: soft, non tender, non distended   EXTREMITIES: No edema, cyanosis or deformity   NEUROLOGICAL: Alert and oriented, no focal deficits, no motor or sensory deficits.

## 2021-09-12 NOTE — CHART NOTE - NSCHARTNOTEFT_GEN_A_CORE
continue w current management  cardio c/s has been placed    Vital Signs Last 24 Hrs  T(C): 36.7 (12 Sep 2021 10:10), Max: 36.8 (11 Sep 2021 20:08)  T(F): 98 (12 Sep 2021 10:10), Max: 98.2 (11 Sep 2021 20:08)  HR: 72 (12 Sep 2021 10:10) (72 - 145)  BP: 115/76 (12 Sep 2021 10:10) (115/76 - 160/93)  BP(mean): --  RR: 19 (12 Sep 2021 10:10) (18 - 28)  SpO2: 99% (12 Sep 2021 10:10) (92% - 99%)                            14.6   5.91  )-----------( 200      ( 12 Sep 2021 09:40 )             44.6     09-12    139  |  107  |  7   ----------------------------<  89  3.5   |  26  |  0.61    Ca    8.7      12 Sep 2021 08:51    TPro  7.1  /  Alb  3.3  /  TBili  1.2  /  DBili  x   /  AST  159<H>  /  ALT  259<H>  /  AlkPhos  90  09-12    PT/INR - ( 11 Sep 2021 19:21 )   PT: 16.9 sec;   INR: 1.48 ratio         PTT - ( 11 Sep 2021 19:21 )  PTT:57.2 sec

## 2021-09-12 NOTE — H&P ADULT - ASSESSMENT
Patient is a 89 y/o male with PMH of HTN, HLD, osteoarthritis, Afib on Pradaxa p/w chest pain and shortness of breath for 3 days.  Admitted for ACS r/o

## 2021-09-12 NOTE — H&P ADULT - PROBLEM SELECTOR PLAN 1
-Patient p/w chest pain, sob and palpitations   -EKG: Afib   -CE x 1 negative   -CT Angio chest: No evidence of pulmonary embolism or acute aortic syndrome.  -Start asa, statin, B Blocker   -ECHO, Telemetry  -Cardio consult

## 2021-09-12 NOTE — H&P ADULT - HISTORY OF PRESENT ILLNESS
Patient is a 88 y/o male with PMH of HTN, HLD, osteoarthritis, Afib on Pradaxa p/w chest pain and shortness of breath for 3 days. Pt speaks [place], daughter at bedside helped with translation. Pt reports that she has been having non exertion, intermittent,  sub-sternal, non radiating, pressure like chest pain associated with shortness of breath, palpitations, b/l leg swelling. She also reports abdominal pain associated with low appetite, urinary frequency,  denies any nausea, vomiting, fever, chills, urinary discomfort or burning micturition.     In ED, patient's initial VS were stable, saturating 96% on room air. She was in no apparent distress.

## 2021-09-13 ENCOUNTER — TRANSCRIPTION ENCOUNTER (OUTPATIENT)
Age: 86
End: 2021-09-13

## 2021-09-13 VITALS
SYSTOLIC BLOOD PRESSURE: 126 MMHG | OXYGEN SATURATION: 98 % | DIASTOLIC BLOOD PRESSURE: 72 MMHG | RESPIRATION RATE: 18 BRPM | HEART RATE: 65 BPM | TEMPERATURE: 98 F

## 2021-09-13 LAB
COVID-19 SPIKE DOMAIN AB INTERP: POSITIVE
COVID-19 SPIKE DOMAIN ANTIBODY RESULT: 203 U/ML — HIGH
SARS-COV-2 IGG+IGM SERPL QL IA: 203 U/ML — HIGH
SARS-COV-2 IGG+IGM SERPL QL IA: POSITIVE

## 2021-09-13 PROCEDURE — 99238 HOSP IP/OBS DSCHRG MGMT 30/<: CPT

## 2021-09-13 RX ORDER — METOPROLOL TARTRATE 50 MG
1 TABLET ORAL
Qty: 60 | Refills: 0
Start: 2021-09-13 | End: 2021-10-12

## 2021-09-13 RX ADMIN — AMLODIPINE BESYLATE 5 MILLIGRAM(S): 2.5 TABLET ORAL at 05:28

## 2021-09-13 RX ADMIN — PANTOPRAZOLE SODIUM 40 MILLIGRAM(S): 20 TABLET, DELAYED RELEASE ORAL at 06:12

## 2021-09-13 RX ADMIN — Medication 81 MILLIGRAM(S): at 11:40

## 2021-09-13 RX ADMIN — DABIGATRAN ETEXILATE MESYLATE 150 MILLIGRAM(S): 150 CAPSULE ORAL at 05:28

## 2021-09-13 RX ADMIN — Medication 20 MILLIGRAM(S): at 05:28

## 2021-09-13 RX ADMIN — DABIGATRAN ETEXILATE MESYLATE 150 MILLIGRAM(S): 150 CAPSULE ORAL at 17:34

## 2021-09-13 RX ADMIN — Medication 25 MILLIGRAM(S): at 05:28

## 2021-09-13 RX ADMIN — Medication 25 MILLIGRAM(S): at 17:34

## 2021-09-13 NOTE — DISCHARGE NOTE PROVIDER - NSDCCPCAREPLAN_GEN_ALL_CORE_FT
PRINCIPAL DISCHARGE DIAGNOSIS  Diagnosis: Hypoxia  Assessment and Plan of Treatment:       SECONDARY DISCHARGE DIAGNOSES  Diagnosis: Exertional chest pain  Assessment and Plan of Treatment:      PRINCIPAL DISCHARGE DIAGNOSIS  Diagnosis: Hypoxia  Assessment and Plan of Treatment: Patient is a 88 y/o with HTN, HLD, osteoarthritis, Afib on Pradaxa comes in with complains of chest pain and shortness of breath for 3 days. Pt reports that she has been having non exertion, intermittent,  sub-sternal, non radiating, pressure like chest pain associated with shortness of breath, palpitations, b/l leg swelling. She also reports abdominal pain associated with low appetite, urinary frequency,  denies any nausea, vomiting, fever, chills, urinary discomfort or burning micturition. In ED, patient's initial VS were stable, saturating 96% on room air.  Pt was admitted to Medicine for further cardiac workup with concerns for angina and elevated troponin. During stay, Pt was followed by Cardiology - Dr. Champagne. EKG shows Afib with LAD and septal infarct, age undetermined.  CT, US abdomen unremarkable.  TTE Echo w/ doppler shows  Estimated pulmonary artery systolic pressure is 40.6 mmHg assuming a right atrial pressure of 5 mmHg, which is consistent with mild pulmonary hypertension.  No further cardiac workup needed, c/w home medications and initiate carafate. Pt is Covid positive as of 9/13/2021. Otherwise, hospital course was remarkable with no complications.   Pt was advised to call and make an appointment to follow up with Cardiology- Dr. Montes De Oca in 2 weeks.  Pt was recommended to follow up with PCP for elevated LFTs.         SECONDARY DISCHARGE DIAGNOSES  Diagnosis: Exertional chest pain  Assessment and Plan of Treatment:

## 2021-09-13 NOTE — DISCHARGE NOTE PROVIDER - HOSPITAL COURSE
Patient is a 88 y/o with HTN, HLD, osteoarthritis, Afib on Pradaxa comes in with complains of chest pain and shortness of breath for 3 days. Pt reports that she has been having non exertion, intermittent,  sub-sternal, non radiating, pressure like chest pain associated with shortness of breath, palpitations, b/l leg swelling. She also reports abdominal pain associated with low appetite, urinary frequency,  denies any nausea, vomiting, fever, chills, urinary discomfort or burning micturition. In ED, patient's initial VS were stable, saturating 96% on room air.    Pt was admitted to Medicine for further cardiac workup with concerns for angina and elevated troponin. During stay, Pt was followed by Cardiology - Dr. Champagne. EKG shows Afib with LAD and septal infarct, age undetermined.  CT, US abdomen unremarkable.  TTE Echo w/ doppler shows  Estimated pulmonary artery systolic pressure is 40.6 mmHg assuming a right atrial pressure of 5 mmHg, which is consistent with mild pulmonary hypertension.  No further cardiac workup needed, c/w home medications and initiate carafate. Pt is Covid positive as of 9/13/2021. Otherwise, hospital course was remarkable with no complications.     Pt was advised to call and make an appointment to follow up with Cardiology- Dr. Montes De Oca in 2 weeks.  Pt was recommended to follow up with PCP for elevated LFTs.

## 2021-09-13 NOTE — PROGRESS NOTE ADULT - SUBJECTIVE AND OBJECTIVE BOX
Patient is a 89y old  Female who presents with a chief complaint of     SUBJECTIVE: Igbo interpretor @ [id number] used. reports still having pinching chest pain but no other symptoms   INTERVAL HPI/OVERNIGHT EVENTS: overnight no acute events     MEDICATIONS  (STANDING):  amLODIPine   Tablet 5 milliGRAM(s) Oral daily  aspirin enteric coated 81 milliGRAM(s) Oral daily  atorvastatin 40 milliGRAM(s) Oral at bedtime  dabigatran 150 milliGRAM(s) Oral every 12 hours  furosemide    Tablet 20 milliGRAM(s) Oral daily  influenza   Vaccine 0.5 milliLiter(s) IntraMuscular once  metoprolol tartrate 25 milliGRAM(s) Oral two times a day  pantoprazole    Tablet 40 milliGRAM(s) Oral before breakfast    MEDICATIONS  (PRN):    Allergies    No Known Allergies    Intolerances      REVIEW OF SYSTEMS:  All other systems reviewed and are negative    Vital Signs Last 24 Hrs  T(C): 36.5 (13 Sep 2021 05:10), Max: 36.8 (12 Sep 2021 15:29)  T(F): 97.7 (13 Sep 2021 05:10), Max: 98.2 (12 Sep 2021 15:29)  HR: 92 (13 Sep 2021 05:10) (81 - 92)  BP: 135/90 (13 Sep 2021 05:10) (123/74 - 135/90)  BP(mean): --  RR: 17 (13 Sep 2021 05:10) (17 - 18)  SpO2: 94% (13 Sep 2021 05:10) (94% - 97%)  Daily     Daily   I&O's Summary    12 Sep 2021 07:01  -  13 Sep 2021 07:00  --------------------------------------------------------  IN: 854 mL / OUT: 0 mL / NET: 854 mL    13 Sep 2021 07:01  -  13 Sep 2021 10:48  --------------------------------------------------------  IN: 320 mL / OUT: 0 mL / NET: 320 mL      CAPILLARY BLOOD GLUCOSE        PHYSICAL EXAM:  GENERAL: NAD, well-groomed, well-developed  HEAD:  Atraumatic, Normocephalic  EYES: EOMI, PERRLA, conjunctiva and sclera clear  ENMT: No tonsillar erythema, exudates, or enlargement; Moist mucous membranes, Good dentition, No lesions  NECK: Supple, No JVD, Normal thyroid  NERVOUS SYSTEM:  Alert & Oriented X3, Good concentration; Motor Strength 5/5 B/L upper and lower extremities; DTRs 2+ intact and symmetric  CHEST/LUNG: Clear to percussion bilaterally; No rales, rhonchi, wheezing, or rubs  HEART: Regular rate and rhythm; No murmurs, rubs, or gallops  ABDOMEN: Soft, Nontender, Nondistended; Bowel sounds present  EXTREMITIES:  2+ Peripheral Pulses, No clubbing, cyanosis, or edema  LYMPH: No lymphadenopathy noted  SKIN: No rashes or lesions    Labs                          14.6   5.91  )-----------( 200      ( 12 Sep 2021 09:40 )             44.6     09-12    139  |  107  |  7   ----------------------------<  89  3.5   |  26  |  0.61    Ca    8.7      12 Sep 2021 08:51    TPro  7.1  /  Alb  3.3  /  TBili  1.2  /  DBili  x   /  AST  159<H>  /  ALT  259<H>  /  AlkPhos  90  09-12    PT/INR - ( 11 Sep 2021 19:21 )   PT: 16.9 sec;   INR: 1.48 ratio         PTT - ( 11 Sep 2021 19:21 )  PTT:57.2 sec  CARDIAC MARKERS ( 12 Sep 2021 16:39 )  .062 ng/mL / x     / x     / x     / x      CARDIAC MARKERS ( 12 Sep 2021 08:51 )  .076 ng/mL / x     / 322 U/L / x     / 4.6 ng/mL  CARDIAC MARKERS ( 11 Sep 2021 19:20 )  .044 ng/mL / x     / x     / x     / x                        DVT prophylaxis: > Lovenox 40mg SQ daily  > Heparin   > SCD's

## 2021-09-13 NOTE — DISCHARGE NOTE NURSING/CASE MANAGEMENT/SOCIAL WORK - PATIENT PORTAL LINK FT
You can access the FollowMyHealth Patient Portal offered by [facility] by registering at the following website: [email or web address]. By joining [employer]’s FollowMyHealth portal, you will also be able to view your health information using other applications (apps) compatible with our system.

## 2021-09-13 NOTE — PROGRESS NOTE ADULT - ASSESSMENT
89 yo female w/ pmhx of HTN, HLD, osteoarthritis, afib on pradaxa admitted to [facility] for chest pain, concerning for angina  - CT abd/pelvis - no acute abdomina  - CTA- no PE  - physical examination unremarkable  - EKG shows afib, septal infarct, age indeterminate  - troponin mildly elevated   PLAN  - c/w TELE, cardiac monitoring  - c/w home medications  - start carafate   - consult CARDIOLOGY

## 2021-09-13 NOTE — DISCHARGE NOTE PROVIDER - CARE PROVIDER_API CALL
Greg Montes De Oca)  Medicine  Cardiology  [address] [place] [address]  Phone: [phone]  Fax: [phone]  Follow Up Time: 2 weeks

## 2021-09-13 NOTE — DISCHARGE NOTE PROVIDER - NSDCMRMEDTOKEN_GEN_ALL_CORE_FT
amLODIPine 5 mg oral tablet: 1 tab(s) orally once a day  atorvastatin 40 mg oral tablet: 1 tab(s) orally once a day  furosemide 20 mg oral tablet: 1 tab(s) orally once a day  Pradaxa 150 mg oral capsule: 1 cap(s) orally 2 times a day   amLODIPine 5 mg oral tablet: 1 tab(s) orally once a day  aspirin 81 mg oral delayed release tablet: 1 tab(s) orally once a day  atorvastatin 40 mg oral tablet: 1 tab(s) orally once a day  furosemide 20 mg oral tablet: 1 tab(s) orally once a day  metoprolol tartrate 25 mg oral tablet: 1 tab(s) orally 2 times a day  pantoprazole 40 mg oral delayed release tablet: 1 tab(s) orally once a day (before a meal)  Pradaxa 150 mg oral capsule: 1 cap(s) orally 2 times a day

## 2021-09-22 DIAGNOSIS — K13.0 DISEASES OF LIPS: ICD-10-CM

## 2021-09-22 DIAGNOSIS — Z96.653 PRESENCE OF ARTIFICIAL KNEE JOINT, BILATERAL: ICD-10-CM

## 2021-09-22 DIAGNOSIS — I10 ESSENTIAL (PRIMARY) HYPERTENSION: ICD-10-CM

## 2021-09-22 DIAGNOSIS — I48.91 UNSPECIFIED ATRIAL FIBRILLATION: ICD-10-CM

## 2021-09-22 DIAGNOSIS — E78.5 HYPERLIPIDEMIA, UNSPECIFIED: ICD-10-CM

## 2021-09-22 DIAGNOSIS — I27.20 PULMONARY HYPERTENSION, UNSPECIFIED: ICD-10-CM

## 2021-09-22 DIAGNOSIS — I24.8 OTHER FORMS OF ACUTE ISCHEMIC HEART DISEASE: ICD-10-CM

## 2021-09-22 DIAGNOSIS — M19.91 PRIMARY OSTEOARTHRITIS, UNSPECIFIED SITE: ICD-10-CM

## 2021-09-22 DIAGNOSIS — R07.9 CHEST PAIN, UNSPECIFIED: ICD-10-CM

## 2021-09-22 DIAGNOSIS — R09.02 HYPOXEMIA: ICD-10-CM

## 2021-09-22 DIAGNOSIS — Z79.01 LONG TERM (CURRENT) USE OF ANTICOAGULANTS: ICD-10-CM

## 2021-09-26 ENCOUNTER — INPATIENT (INPATIENT)
Facility: HOSPITAL | Age: 86
LOS: 3 days | Discharge: HOME | End: 2021-09-30
Attending: INTERNAL MEDICINE | Admitting: INTERNAL MEDICINE
Payer: MEDICARE

## 2021-09-26 VITALS — WEIGHT: 207.01 LBS | HEIGHT: 62 IN

## 2021-09-26 DIAGNOSIS — Z96.611 PRESENCE OF RIGHT ARTIFICIAL SHOULDER JOINT: Chronic | ICD-10-CM

## 2021-09-26 DIAGNOSIS — Z96.653 PRESENCE OF ARTIFICIAL KNEE JOINT, BILATERAL: Chronic | ICD-10-CM

## 2021-09-26 PROCEDURE — 93010 ELECTROCARDIOGRAM REPORT: CPT

## 2021-09-26 PROCEDURE — 99291 CRITICAL CARE FIRST HOUR: CPT

## 2021-09-26 NOTE — ED ADULT NURSE NOTE - NSIMPLEMENTINTERV_GEN_ALL_ED
Implemented All Fall Risk Interventions:  [place] to call system. Call bell, personal items and telephone within reach. Instruct patient to call for assistance. Room bathroom lighting operational. Non-slip footwear when patient is off stretcher. Physically safe environment: no spills, clutter or unnecessary equipment. Stretcher in lowest position, wheels locked, appropriate side rails in place. Provide visual cue, wrist band, yellow gown, etc. Monitor gait and stability. Monitor for mental status changes and reorient to person, place, and time. Review medications for side effects contributing to fall risk. Reinforce activity limits and safety measures with patient and family.

## 2021-09-26 NOTE — ED ADULT NURSE NOTE - ED STAT RN HANDOFF DETAILS
Report given to receiving RN April, pts history, current condition and reason for admission discussed, safety concerns addressed and reviewed, pt currently in stable condition, IV flushes for patency and site shows no signs or symptoms of infiltrate, dressing is clean dry and intact, pt is aware of plan of care. Pt education deemed successful at time of report after patient demonstrates successful teach back for proficiency.

## 2021-09-26 NOTE — ED ADULT NURSE NOTE - CAS TRG GENERAL NORM CIRC DET
Infectious Diseases Daily Progress Note   [facility]     Patient's Name: Marsha Puckett   Date of Service: 5/5/2021     Date of admission: 2/8/2021                Hospital Day: 87  Principal Diagnosis:                             Marsha Puckett who is a 41 year old female admitted 2/8/2021  8:10 PM with   Scheduled Medications   Reviewed   Past Medical History, Social History, Medications and Allergies reviewed.   Reviewed Pertinent: Laboratory studies, radiographic studies, medications, and recent progress notes.     Subjective:   Afebrile.  No new complaints. Poor oral intake    Objective:    VITAL SIGNS:  98.4, 98, 14, 108/53  Physical examination:  General : Awake, NAD, appears comfortable, obese  Head:  PERRL, No icterus, no oral thrush  Neck : supple    Pulm: Clear to auscultation, no wheezes, no rales  GI: Abdomen is soft , midline abdominal wound is covered with dressing.    Left added ostomy bag has semi solid brown stool  : + gray   CVS:  Pulse regular, S1, S2 normal, no m/g/r   Ext:   BLE pitting edema  Skin: No rashes  Lines:   right upper chest MediPort site appears unremarkable  MSK: No major joint swelling , pain, erythema, effusion.      Laboratory data, microbiology, imaging:    Recent Labs   Lab 05/05/21  0503 05/04/21  0440 05/03/21  0433   WBC 10.3 10.0 9.6   HGB 6.9* 7.0* 6.3*   HCT 22.5* 22.5* 19.7*   PLT 54* 60* 73*     Recent Labs   Lab 05/05/21  0503 05/04/21  0440 05/03/21  0433   SODIUM 133* 133* 134*   POTASSIUM 4.0 3.5 3.6   BUN 14 16 13   CREATININE 0.99* 1.02* 1.07*   GLUCOSE 96 100* 97   CALCIUM 8.1* 8.1* 8.0*   ALBUMIN 1.7*  --   --    AST 21  --   --    GPT 28  --   --    ALKPT 166*  --   --    BILIRUBIN 0.2  --   --      Urinalysis on 04/11/2021 shows 6-10 wbc's, more than 100 RBCs (UA was obtained while patient was having hematuria and CBI)     Results for MARSHA PUCKETT (MRN [id number]) as of 3/5/2021 11:22   Ref. Range 3/4/2021 17:00    ERYTHROCYTES, URINALYSIS Latest Ref Range: None Seen, 1 to 2 /hpf 11 to 25 (A)   LEUKOCYTES, URINALYSIS Latest Ref Range: None Seen, 1 to 5 /hpf 26 to 100 (A)       COVID-19 PCR was negative on 12/16/2020    Microbiology:  Blood cultures from 12/16/2020 are negative  Bacterial culture from 12/16/20 labeled as abdominal fluid is positive for Clostridium perfringens  Urine cx 3/4/21 + citrobacter  Blood cultures from 04/11/2021 are positive for Gram-negative bacilli    Radiology/Imaging:   Reviewed         Assessment:     Enterobacter aerogenes and Klebsiella bacteremia on the basis of positive blood culture from 04/11/2021 .  Suspect PICC line infection.  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 Patient has indwelling ureteral stents     Vaginal discharge , unknown etiology. Suspect tissue sloughing from radiation damage. 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  Pathology results from 03/29/21 are negative for any evidence of malignancy    Bilateral hydronephrosis, gross hematuria status post cystoscopy, bilateral retrograde pyelogram, bilateral ureteral stent placement, clot evacuation with fulguration on 03/20/2021    Intra abd abscess  (CT abdomen 3/4/21)  IR consulted 3/5/21 but unable to place a drain.  Aspirate culture was negative.    Perforated viscus causing acute bacterial peritonitis complicated by multiple intra-abdominal abscesses s/p exp lap, sigmoid and rectal resection, transverse loop colostomy, lysis of adhesion, drainage of retroperitoneal abscesses on 12/17/2020.  Status post IR drain placement 01/20/2021, exchange in upsizing on 01/27/2021. All drains removed prior to discharge from [facility]. Intra-abdominal abscesses have been resolved as far as we can tell. abx stopped  1/30/21      Left leg acute extensive DVT status post thrombectomy and IVC filter placement  Severe protein calorie malnutrition, hypoalbuminemia  Metastatic cervical  cancer status post chemotherapy and radiation, under the care Dr. Lebron  Left-sided hydronephrosis.  Severe protein calorie malnutrition hypoalbuminemia  Metastatic cervical cancer s/p chemotherapy and radiation, under the care Dr. Lebron  Chronic kidney disease     Plan & Recommendations:     Completed antibiotic therapy and repeat blood cultures neg.   Encourage PO   Call me if any fevers  No isolation        BALJINDER Curran MD  Infectious Diseases  [phone] (P)   [phone] (O)  5/5/2021     10:29 AM      Strong peripheral pulses

## 2021-09-27 DIAGNOSIS — Z79.82 LONG TERM (CURRENT) USE OF ASPIRIN: ICD-10-CM

## 2021-09-27 PROBLEM — I48.91 UNSPECIFIED ATRIAL FIBRILLATION: Chronic | Status: ACTIVE | Noted: 2021-09-11

## 2021-09-27 LAB
A1C WITH ESTIMATED AVERAGE GLUCOSE RESULT: 5.8 % — HIGH (ref 4–5.6)
ALBUMIN SERPL ELPH-MCNC: 3.1 G/DL — LOW (ref 3.3–5)
ALP SERPL-CCNC: 109 U/L — SIGNIFICANT CHANGE UP (ref 40–120)
ALT FLD-CCNC: 158 U/L — HIGH (ref 12–78)
ANION GAP SERPL CALC-SCNC: 4 MMOL/L — LOW (ref 5–17)
APPEARANCE UR: CLEAR — SIGNIFICANT CHANGE UP
APTT BLD: 41.9 SEC — HIGH (ref 27.5–35.5)
AST SERPL-CCNC: 102 U/L — HIGH (ref 15–37)
BACTERIA # UR AUTO: ABNORMAL
BASE EXCESS BLDA CALC-SCNC: 2.1 MMOL/L — HIGH (ref -2–2)
BASOPHILS # BLD AUTO: 0.02 K/UL — SIGNIFICANT CHANGE UP (ref 0–0.2)
BASOPHILS NFR BLD AUTO: 0.4 % — SIGNIFICANT CHANGE UP (ref 0–2)
BILIRUB SERPL-MCNC: 0.8 MG/DL — SIGNIFICANT CHANGE UP (ref 0.2–1.2)
BILIRUB UR-MCNC: NEGATIVE — SIGNIFICANT CHANGE UP
BLOOD GAS COMMENTS: SIGNIFICANT CHANGE UP
BLOOD GAS SOURCE: SIGNIFICANT CHANGE UP
BUN SERPL-MCNC: 14 MG/DL — SIGNIFICANT CHANGE UP (ref 7–23)
CALCIUM SERPL-MCNC: 8.2 MG/DL — LOW (ref 8.5–10.1)
CHLORIDE SERPL-SCNC: 109 MMOL/L — HIGH (ref 96–108)
CO2 SERPL-SCNC: 28 MMOL/L — SIGNIFICANT CHANGE UP (ref 22–31)
COLOR SPEC: YELLOW — SIGNIFICANT CHANGE UP
CREAT SERPL-MCNC: 1.03 MG/DL — SIGNIFICANT CHANGE UP (ref 0.5–1.3)
DIFF PNL FLD: ABNORMAL
EOSINOPHIL # BLD AUTO: 0.31 K/UL — SIGNIFICANT CHANGE UP (ref 0–0.5)
EOSINOPHIL NFR BLD AUTO: 5.5 % — SIGNIFICANT CHANGE UP (ref 0–6)
EPI CELLS # UR: ABNORMAL
ESTIMATED AVERAGE GLUCOSE: 120 MG/DL — HIGH (ref 68–114)
FLUAV AG NPH QL: SIGNIFICANT CHANGE UP
FLUBV AG NPH QL: SIGNIFICANT CHANGE UP
GLUCOSE SERPL-MCNC: 180 MG/DL — HIGH (ref 70–99)
GLUCOSE UR QL: NEGATIVE MG/DL — SIGNIFICANT CHANGE UP
GRAN CASTS # UR COMP ASSIST: ABNORMAL /LPF
HCO3 BLDA-SCNC: 27 MMOL/L — SIGNIFICANT CHANGE UP (ref 21–29)
HCT VFR BLD CALC: 37.1 % — SIGNIFICANT CHANGE UP (ref 34.5–45)
HGB BLD-MCNC: 12.1 G/DL — SIGNIFICANT CHANGE UP (ref 11.5–15.5)
HOROWITZ INDEX BLDA+IHG-RTO: 0.4 — SIGNIFICANT CHANGE UP
HYALINE CASTS # UR AUTO: ABNORMAL /LPF
IMM GRANULOCYTES NFR BLD AUTO: 0 % — SIGNIFICANT CHANGE UP (ref 0–1.5)
INR BLD: 1.51 RATIO — HIGH (ref 0.88–1.16)
KETONES UR-MCNC: NEGATIVE — SIGNIFICANT CHANGE UP
LEUKOCYTE ESTERASE UR-ACNC: ABNORMAL
LYMPHOCYTES # BLD AUTO: 1.04 K/UL — SIGNIFICANT CHANGE UP (ref 1–3.3)
LYMPHOCYTES # BLD AUTO: 18.6 % — SIGNIFICANT CHANGE UP (ref 13–44)
MCHC RBC-ENTMCNC: 32 PG — SIGNIFICANT CHANGE UP (ref 27–34)
MCHC RBC-ENTMCNC: 32.6 GM/DL — SIGNIFICANT CHANGE UP (ref 32–36)
MCV RBC AUTO: 98.1 FL — SIGNIFICANT CHANGE UP (ref 80–100)
MONOCYTES # BLD AUTO: 0.62 K/UL — SIGNIFICANT CHANGE UP (ref 0–0.9)
MONOCYTES NFR BLD AUTO: 11.1 % — SIGNIFICANT CHANGE UP (ref 2–14)
NEUTROPHILS # BLD AUTO: 3.6 K/UL — SIGNIFICANT CHANGE UP (ref 1.8–7.4)
NEUTROPHILS NFR BLD AUTO: 64.4 % — SIGNIFICANT CHANGE UP (ref 43–77)
NITRITE UR-MCNC: NEGATIVE — SIGNIFICANT CHANGE UP
NRBC # BLD: 0 /100 WBCS — SIGNIFICANT CHANGE UP (ref 0–0)
NT-PROBNP SERPL-SCNC: 2244 PG/ML — HIGH (ref 0–450)
PCO2 BLDA: 43 MMHG — SIGNIFICANT CHANGE UP (ref 32–46)
PH BLD: 7.41 — SIGNIFICANT CHANGE UP (ref 7.35–7.45)
PH UR: 5 — SIGNIFICANT CHANGE UP (ref 5–8)
PLATELET # BLD AUTO: 190 K/UL — SIGNIFICANT CHANGE UP (ref 150–400)
PO2 BLDA: 166 MMHG — HIGH (ref 74–108)
POTASSIUM SERPL-MCNC: 3.9 MMOL/L — SIGNIFICANT CHANGE UP (ref 3.5–5.3)
POTASSIUM SERPL-SCNC: 3.9 MMOL/L — SIGNIFICANT CHANGE UP (ref 3.5–5.3)
PROT SERPL-MCNC: 6.5 GM/DL — SIGNIFICANT CHANGE UP (ref 6–8.3)
PROT UR-MCNC: 100 MG/DL
PROTHROM AB SERPL-ACNC: 17.2 SEC — HIGH (ref 10.6–13.6)
RBC # BLD: 3.78 M/UL — LOW (ref 3.8–5.2)
RBC # FLD: 13.3 % — SIGNIFICANT CHANGE UP (ref 10.3–14.5)
RBC CASTS # UR COMP ASSIST: SIGNIFICANT CHANGE UP /HPF (ref 0–4)
SAO2 % BLDA: 99 % — HIGH (ref 92–96)
SARS-COV-2 RNA SPEC QL NAA+PROBE: SIGNIFICANT CHANGE UP
SODIUM SERPL-SCNC: 141 MMOL/L — SIGNIFICANT CHANGE UP (ref 135–145)
SP GR SPEC: 1.02 — SIGNIFICANT CHANGE UP (ref 1.01–1.02)
TROPONIN I SERPL-MCNC: 0.03 NG/ML — SIGNIFICANT CHANGE UP (ref 0.01–0.04)
TROPONIN I SERPL-MCNC: 0.04 NG/ML — SIGNIFICANT CHANGE UP (ref 0.01–0.04)
UROBILINOGEN FLD QL: 1 MG/DL
WBC # BLD: 5.59 K/UL — SIGNIFICANT CHANGE UP (ref 3.8–10.5)
WBC # FLD AUTO: 5.59 K/UL — SIGNIFICANT CHANGE UP (ref 3.8–10.5)
WBC UR QL: SIGNIFICANT CHANGE UP

## 2021-09-27 PROCEDURE — 74176 CT ABD & PELVIS W/O CONTRAST: CPT | Mod: 26

## 2021-09-27 PROCEDURE — 71045 X-RAY EXAM CHEST 1 VIEW: CPT | Mod: 26

## 2021-09-27 PROCEDURE — 99223 1ST HOSP IP/OBS HIGH 75: CPT

## 2021-09-27 RX ORDER — FUROSEMIDE 40 MG
40 TABLET ORAL
Refills: 0 | Status: DISCONTINUED | OUTPATIENT
Start: 2021-09-27 | End: 2021-09-28

## 2021-09-27 RX ORDER — DABIGATRAN ETEXILATE MESYLATE 150 MG/1
150 CAPSULE ORAL EVERY 12 HOURS
Refills: 0 | Status: DISCONTINUED | OUTPATIENT
Start: 2021-09-27 | End: 2021-09-30

## 2021-09-27 RX ORDER — FAMOTIDINE 10 MG/ML
40 INJECTION INTRAVENOUS ONCE
Refills: 0 | Status: COMPLETED | OUTPATIENT
Start: 2021-09-27 | End: 2021-09-27

## 2021-09-27 RX ORDER — CEFTRIAXONE 500 MG/1
1000 INJECTION, POWDER, FOR SOLUTION INTRAMUSCULAR; INTRAVENOUS ONCE
Refills: 0 | Status: COMPLETED | OUTPATIENT
Start: 2021-09-27 | End: 2021-09-27

## 2021-09-27 RX ORDER — SUCRALFATE 1 G
1 TABLET ORAL
Refills: 0 | Status: DISCONTINUED | OUTPATIENT
Start: 2021-09-27 | End: 2021-09-30

## 2021-09-27 RX ORDER — PANTOPRAZOLE SODIUM 20 MG/1
40 TABLET, DELAYED RELEASE ORAL
Refills: 0 | Status: DISCONTINUED | OUTPATIENT
Start: 2021-09-27 | End: 2021-09-30

## 2021-09-27 RX ORDER — POLYETHYLENE GLYCOL 3350 17 G/17G
17 POWDER, FOR SOLUTION ORAL DAILY
Refills: 0 | Status: DISCONTINUED | OUTPATIENT
Start: 2021-09-27 | End: 2021-09-30

## 2021-09-27 RX ORDER — ATORVASTATIN CALCIUM 80 MG/1
40 TABLET, FILM COATED ORAL AT BEDTIME
Refills: 0 | Status: DISCONTINUED | OUTPATIENT
Start: 2021-09-27 | End: 2021-09-30

## 2021-09-27 RX ORDER — LIDOCAINE 4 G/100G
10 CREAM TOPICAL ONCE
Refills: 0 | Status: COMPLETED | OUTPATIENT
Start: 2021-09-27 | End: 2021-09-27

## 2021-09-27 RX ORDER — DABIGATRAN ETEXILATE MESYLATE 150 MG/1
1 CAPSULE ORAL
Qty: 0 | Refills: 0 | DISCHARGE

## 2021-09-27 RX ORDER — AMLODIPINE BESYLATE 2.5 MG/1
5 TABLET ORAL DAILY
Refills: 0 | Status: DISCONTINUED | OUTPATIENT
Start: 2021-09-27 | End: 2021-09-30

## 2021-09-27 RX ORDER — METOPROLOL TARTRATE 50 MG
25 TABLET ORAL
Refills: 0 | Status: DISCONTINUED | OUTPATIENT
Start: 2021-09-27 | End: 2021-09-30

## 2021-09-27 RX ORDER — BENZOCAINE 10 %
1 GEL (GRAM) MUCOUS MEMBRANE
Qty: 2 | Refills: 0
Start: 2021-09-27 | End: 2021-09-29

## 2021-09-27 RX ORDER — BENZOCAINE 10 %
1 GEL (GRAM) MUCOUS MEMBRANE
Qty: 8 | Refills: 0
Start: 2021-09-27 | End: 2021-09-30

## 2021-09-27 RX ORDER — FUROSEMIDE 40 MG
40 TABLET ORAL ONCE
Refills: 0 | Status: COMPLETED | OUTPATIENT
Start: 2021-09-27 | End: 2021-09-27

## 2021-09-27 RX ORDER — ASPIRIN/CALCIUM CARB/MAGNESIUM 324 MG
81 TABLET ORAL DAILY
Refills: 0 | Status: DISCONTINUED | OUTPATIENT
Start: 2021-09-27 | End: 2021-09-30

## 2021-09-27 RX ADMIN — Medication 81 MILLIGRAM(S): at 11:32

## 2021-09-27 RX ADMIN — CEFTRIAXONE 1000 MILLIGRAM(S): 500 INJECTION, POWDER, FOR SOLUTION INTRAMUSCULAR; INTRAVENOUS at 02:00

## 2021-09-27 RX ADMIN — Medication 40 MILLIGRAM(S): at 01:08

## 2021-09-27 RX ADMIN — PANTOPRAZOLE SODIUM 40 MILLIGRAM(S): 20 TABLET, DELAYED RELEASE ORAL at 06:18

## 2021-09-27 RX ADMIN — Medication 20 MILLIGRAM(S): at 04:18

## 2021-09-27 RX ADMIN — POLYETHYLENE GLYCOL 3350 17 GRAM(S): 17 POWDER, FOR SOLUTION ORAL at 11:32

## 2021-09-27 RX ADMIN — Medication 10 MILLIGRAM(S): at 10:20

## 2021-09-27 RX ADMIN — AMLODIPINE BESYLATE 5 MILLIGRAM(S): 2.5 TABLET ORAL at 06:18

## 2021-09-27 RX ADMIN — LIDOCAINE 10 MILLILITER(S): 4 CREAM TOPICAL at 04:19

## 2021-09-27 RX ADMIN — ATORVASTATIN CALCIUM 40 MILLIGRAM(S): 80 TABLET, FILM COATED ORAL at 21:27

## 2021-09-27 RX ADMIN — Medication 25 MILLIGRAM(S): at 17:09

## 2021-09-27 RX ADMIN — DABIGATRAN ETEXILATE MESYLATE 150 MILLIGRAM(S): 150 CAPSULE ORAL at 17:09

## 2021-09-27 RX ADMIN — FAMOTIDINE 40 MILLIGRAM(S): 10 INJECTION INTRAVENOUS at 04:19

## 2021-09-27 RX ADMIN — Medication 1 GRAM(S): at 21:27

## 2021-09-27 RX ADMIN — DABIGATRAN ETEXILATE MESYLATE 150 MILLIGRAM(S): 150 CAPSULE ORAL at 06:18

## 2021-09-27 RX ADMIN — Medication 40 MILLIGRAM(S): at 17:09

## 2021-09-27 RX ADMIN — Medication 5 MILLIGRAM(S): at 21:27

## 2021-09-27 RX ADMIN — Medication 10 MILLIGRAM(S): at 08:00

## 2021-09-27 RX ADMIN — Medication 10 MILLIGRAM(S): at 17:09

## 2021-09-27 RX ADMIN — Medication 30 MILLILITER(S): at 04:18

## 2021-09-27 RX ADMIN — Medication 40 MILLIGRAM(S): at 08:00

## 2021-09-27 RX ADMIN — CEFTRIAXONE 100 MILLIGRAM(S): 500 INJECTION, POWDER, FOR SOLUTION INTRAMUSCULAR; INTRAVENOUS at 01:35

## 2021-09-27 NOTE — ED PROVIDER NOTE - PROGRESS NOTE DETAILS
off bipap, saturating well. s/p lasix 40mg IV, patient having exertional dyspnea likely secondary to chf? or physical deconditioning, vs long covid? however not hypoxic. elevated BNP 2000, ekg unremarkable. to be admitted for chf exacerbation.

## 2021-09-27 NOTE — PROGRESS NOTE ADULT - SUBJECTIVE AND OBJECTIVE BOX
Patient is a 89y old  Female who presents with a chief complaint of Acute on Chronic CHF, Abdominal pain (27 Sep 2021 04:42)    Igboo [id number]  INTERVAL HPI/OVERNIGHT EVENTS: no acute events  SUBJECTIVE: no fever/chills/nausea/vomittng. reports abdominal pain    MEDICATIONS  (STANDING):  amLODIPine   Tablet 5 milliGRAM(s) Oral daily  aspirin enteric coated 81 milliGRAM(s) Oral daily  atorvastatin 40 milliGRAM(s) Oral at bedtime  bisacodyl 5 milliGRAM(s) Oral at bedtime  dabigatran 150 milliGRAM(s) Oral every 12 hours  dicyclomine 10 milliGRAM(s) Oral three times a day before meals  furosemide   Injectable 40 milliGRAM(s) IV Push two times a day  metoprolol tartrate 25 milliGRAM(s) Oral two times a day  pantoprazole    Tablet 40 milliGRAM(s) Oral before breakfast  polyethylene glycol 3350 17 Gram(s) Oral daily  sucralfate suspension 1 Gram(s) Oral four times a day    MEDICATIONS  (PRN):  aluminum hydroxide/magnesium hydroxide/simethicone Suspension 30 milliLiter(s) Oral every 4 hours PRN Dyspepsia    Allergies    No Known Allergies    Intolerances      REVIEW OF SYSTEMS:  All other systems reviewed and are negative    Vital Signs Last 24 Hrs  T(C): 36.4 (27 Sep 2021 16:32), Max: 37.2 (27 Sep 2021 05:00)  T(F): 97.5 (27 Sep 2021 16:32), Max: 99 (27 Sep 2021 05:00)  HR: 66 (27 Sep 2021 16:32) (50 - 82)  BP: 118/72 (27 Sep 2021 16:32) (110/68 - 137/79)  BP(mean): --  RR: 18 (27 Sep 2021 16:32) (18 - 22)  SpO2: 96% (27 Sep 2021 16:32) (93% - 100%)  Daily Height in cm: 157.48 (26 Sep 2021 23:22)    Daily   I&O's Summary    CAPILLARY BLOOD GLUCOSE        PHYSICAL EXAM:  GENERAL: NAD, well-groomed, well-developed  HEAD:  Atraumatic, Normocephalic  EYES: EOMI, PERRLA, conjunctiva and sclera clear  ENMT: No tonsillar erythema, exudates, or enlargement; Moist mucous membranes, Good dentition, No lesions  NECK: Supple, No JVD, Normal thyroid  NERVOUS SYSTEM:  Alert & Oriented X3, Good concentration; Motor Strength 5/5 B/L upper and lower extremities; DTRs 2+ intact and symmetric  CHEST/LUNG: Clear to percussion bilaterally; No rales, rhonchi, wheezing, or rubs  HEART: Regular rate and rhythm; No murmurs, rubs, or gallops  ABDOMEN: Soft, Nontender, Nondistended; Bowel sounds present  EXTREMITIES:  1+ edema  LYMPH: No lymphadenopathy noted  SKIN: No rashes or lesions    Labs                          12.1   5.59  )-----------( 190      ( 27 Sep 2021 00:24 )             37.1         141  |  109<H>  |  14  ----------------------------<  180<H>  3.9   |  28  |  1.03    Ca    8.2<L>      27 Sep 2021 00:24    TPro  6.5  /  Alb  3.1<L>  /  TBili  0.8  /  DBili  x   /  AST  102<H>  /  ALT  158<H>  /  AlkPhos  109      PT/INR - ( 27 Sep 2021 00:24 )   PT: 17.2 sec;   INR: 1.51 ratio         PTT - ( 27 Sep 2021 00:24 )  PTT:41.9 sec  CARDIAC MARKERS ( 27 Sep 2021 10:12 )  .040 ng/mL / x     / x     / x     / x      CARDIAC MARKERS ( 27 Sep 2021 00:24 )  .027 ng/mL / x     / x     / x     / x          Urinalysis Basic - ( 27 Sep 2021 00:24 )    Color: Yellow / Appearance: Clear / S.025 / pH: x  Gluc: x / Ketone: Negative  / Bili: Negative / Urobili: 1 mg/dL   Blood: x / Protein: 100 mg/dL / Nitrite: Negative   Leuk Esterase: Small / RBC: 0-2 /HPF / WBC 3-5   Sq Epi: x / Non Sq Epi: Moderate / Bacteria: Few

## 2021-09-27 NOTE — ED PROVIDER NOTE - NS ED ATTENDING STATEMENT MOD
I have personally provided the amount of critical care time documented below excluding time spent on separate procedures.
.

## 2021-09-27 NOTE — H&P ADULT - NSHPLABSRESULTS_GEN_ALL_CORE
T(C): 36.8 (21 @ 04:01), Max: 36.8 (21 @ 04:01)  HR: 75 (21 @ 04:01) (60 - 82)  BP: 110/68 (21 @ 04:01) (110/68 - 133/70)  RR: 20 (21 @ 04:01) (19 - 22)  SpO2: 96% (21 @ 04:01) (96% - 100%)                        12.1   5.59  )-----------( 190      ( 27 Sep 2021 00:24 )             37.1         141  |  109<H>  |  14  ----------------------------<  180<H>  3.9   |  28  |  1.03    Ca    8.2<L>      27 Sep 2021 00:24    TPro  6.5  /  Alb  3.1<L>  /  TBili  0.8  /  DBili  x   /  AST  102<H>  /  ALT  158<H>  /  AlkPhos  109      LIVER FUNCTIONS - ( 27 Sep 2021 00:24 )  Alb: 3.1 g/dL / Pro: 6.5 gm/dL / ALK PHOS: 109 U/L / ALT: 158 U/L / AST: 102 U/L / GGT: x           PT/INR - ( 27 Sep 2021 00:24 )   PT: 17.2 sec;   INR: 1.51 ratio         PTT - ( 27 Sep 2021 00:24 )  PTT:41.9 sec  Urinalysis Basic - ( 27 Sep 2021 00:24 )    Color: Yellow / Appearance: Clear / S.025 / pH: x  Gluc: x / Ketone: Negative  / Bili: Negative / Urobili: 1 mg/dL   Blood: x / Protein: 100 mg/dL / Nitrite: Negative   Leuk Esterase: Small / RBC: 0-2 /HPF / WBC 3-5   Sq Epi: x / Non Sq Epi: Moderate / Bacteria: Few          aluminum hydroxide/magnesium hydroxide/simethicone Suspension 30 milliLiter(s) Oral every 4 hours PRN  amLODIPine   Tablet 5 milliGRAM(s) Oral daily  aspirin enteric coated 81 milliGRAM(s) Oral daily  atorvastatin 40 milliGRAM(s) Oral at bedtime  bisacodyl 5 milliGRAM(s) Oral at bedtime  dabigatran 150 milliGRAM(s) Oral every 12 hours  dicyclomine 10 milliGRAM(s) Oral three times a day before meals  furosemide   Injectable 40 milliGRAM(s) IV Push two times a day  metoprolol tartrate 25 milliGRAM(s) Oral two times a day  pantoprazole    Tablet 40 milliGRAM(s) Oral before breakfast  polyethylene glycol 3350 17 Gram(s) Oral daily T(C): 36.8 (21 @ 04:01), Max: 36.8 (21 @ 04:01)  HR: 75 (21 @ 04:01) (60 - 82)  BP: 110/68 (21 @ 04:01) (110/68 - 133/70)  RR: 20 (21 @ 04:01) (19 - 22)  SpO2: 96% (21 @ 04:01) (96% - 100%)                        12.1   5.59  )-----------( 190      ( 27 Sep 2021 00:24 )             37.1         141  |  109<H>  |  14  ----------------------------<  180<H>  3.9   |  28  |  1.03    Ca    8.2<L>      27 Sep 2021 00:24    TPro  6.5  /  Alb  3.1<L>  /  TBili  0.8  /  DBili  x   /  AST  102<H>  /  ALT  158<H>  /  AlkPhos  109      LIVER FUNCTIONS - ( 27 Sep 2021 00:24 )  Alb: 3.1 g/dL / Pro: 6.5 gm/dL / ALK PHOS: 109 U/L / ALT: 158 U/L / AST: 102 U/L / GGT: x           PT/INR - ( 27 Sep 2021 00:24 )   PT: 17.2 sec;   INR: 1.51 ratio         PTT - ( 27 Sep 2021 00:24 )  PTT:41.9 sec  Urinalysis Basic - ( 27 Sep 2021 00:24 )    Color: Yellow / Appearance: Clear / S.025 / pH: x  Gluc: x / Ketone: Negative  / Bili: Negative / Urobili: 1 mg/dL   Blood: x / Protein: 100 mg/dL / Nitrite: Negative   Leuk Esterase: Small / RBC: 0-2 /HPF / WBC 3-5   Sq Epi: x / Non Sq Epi: Moderate / Bacteria: Few      EKG - Atrial Fib     aluminum hydroxide/magnesium hydroxide/simethicone Suspension 30 milliLiter(s) Oral every 4 hours PRN  amLODIPine   Tablet 5 milliGRAM(s) Oral daily  aspirin enteric coated 81 milliGRAM(s) Oral daily  atorvastatin 40 milliGRAM(s) Oral at bedtime  bisacodyl 5 milliGRAM(s) Oral at bedtime  dabigatran 150 milliGRAM(s) Oral every 12 hours  dicyclomine 10 milliGRAM(s) Oral three times a day before meals  furosemide   Injectable 40 milliGRAM(s) IV Push two times a day  metoprolol tartrate 25 milliGRAM(s) Oral two times a day  pantoprazole    Tablet 40 milliGRAM(s) Oral before breakfast  polyethylene glycol 3350 17 Gram(s) Oral daily

## 2021-09-27 NOTE — PATIENT PROFILE ADULT - SAFE PLACE TO LIVE
Writer received call from pt, who reported that his mail order delivery was delayed due to the one-week supply previously ordered being processed before the 90-day order. This led to [facility] mail order waiting to process the 90-day order until 90% of his 7-day supply was exhausted. Pt confirmed that his 90-day supply was shipped yesterday.  Writer e-prescribed 7-day supply to [facility] [address] [place] [address] Pharmacy ([phone]). Qty 14, refills 0.         no

## 2021-09-27 NOTE — H&P ADULT - HISTORY OF PRESENT ILLNESS
89 y/o Igbo speaking obese F w/ PMHx of Atrial Fibrillation on Pradaxa. HTN, HLD, DM, OA presents to the ED with shortness of breath worsening from prior discharge 9/13/21, BIBEMS w/ CPAP for respiratory distress. Daughter noted patient to be breathing heavily, having increased LE pitting edema and bloating feeling on abdomen. Denies any chest pain, headaches, nausea/vomiting, fevers, chills, coughs, falls, trauma, syncope, lightheadedness. Reviewed prior TTE echo with EF 55%, DHF, mild pulm htn.        88 y/o Igbo speaking obese F w/ PMHx of Atrial Fibrillation on Pradaxa. HTN, HLD, OA, Diastolic HF recently admitted early this month for Pulm HTN, hypoxia, Diastolic HF and abdominal pain presents to the ED c/o worsening shortness of breath, and abdominal pain. Per Daughter who translated, this evening she noted patient to be breathing heavily thus called EMS. Pt reports increasing LE pitting edema, abdominal pain extending up to her chest, as well as bloating, and palpitations. Pt reports diffuse abdominal soreness for weeks (s/p CTA chest A/P on last admx no acute pathology +MARES), as well as early satiety, constipation. Pt denies chest pain, nausea/vomiting, dysuria, fevers, chills, coughs, or dizziness. Of note pt reports she drinks ALOT of water; about 2 gallons a day, also uses alot of salt in her cooking.    Pt brought in on CPAP by EMS eventually tapered down to RA. Labs sig for BNP 2244, CXR show some pulm edema. Pt given Lasix w/ improvement

## 2021-09-27 NOTE — ED PROVIDER NOTE - OBJECTIVE STATEMENT
89F PMHx of AFIB on pradaxa. HTN, HLD, DM, obesity, OA presenting with shortness of breath worsening from prior discharge 9/13/21, BIBEMS w/ CPAP for respiratory distress. Daughter noted patient to be breathing heavily, having increased LE pitting edema and bloating feeling on abdomen. Denies any chest pain, headaches, nausea/vomiting, fevers, chills, coughs, falls, trauma, syncope, lightheadedness. Reviewed prior TTE echo with EF 55%, DHF, mild pulm htn.

## 2021-09-27 NOTE — H&P ADULT - ASSESSMENT
89 y/o Igbo speaking obese F w/ PMHx of Atrial Fibrillation on Pradaxa. HTN, HLD, OA, Diastolic HF recently admitted early this month for Pulm HTN, hypoxia, Diastolic HF and abdominal pain presents to the ED c/o worsening shortness of breath, and abdominal pain. Pt being admitted for Acute on Chronic CHF    1) Acute on Chronic CHF  - start Lasix 40mg IV BID  - Tele monitoring  - Trend troponins  - last 2D Echo reviewed  - Strict I/Os, daily wts  - Fluid restriction  - c/w BB  - Pt advised to cut back of fluid intake to about 1.2L a day and dec salt intake    2) Abdominal pain  - Etiology unclear however pt reporting possible sxs of H. pylori  - f/u stool for H.pylori  - bentyl prn  - laxatives  - PPI  - Outpt f/u w/ GI for further w/u    3) Atrial Fibrillation  - c/w BB     88 y/o Igbo speaking obese F w/ PMHx of Atrial Fibrillation on Pradaxa. HTN, HLD, OA, Diastolic HF recently admitted early this month for Pulm HTN, hypoxia, Diastolic HF and abdominal pain presents to the ED c/o worsening shortness of breath, and abdominal pain. Pt being admitted for Acute on Chronic CHF    1) Acute on Chronic CHF  - start Lasix 40mg IV BID  - Tele monitoring  - Trend troponins  - last 2D Echo reviewed  - Strict I/Os, daily wts  - Fluid restriction  - c/w BB  - Pt advised to cut back of fluid intake to about 1.2L a day and dec salt intake    2) Abdominal pain  - Etiology unclear however pt reporting possible sxs of H. pylori  - f/u stool for H.pylori  - In ED, pt given a mix of maalox, lidocaine viscous, bentyl and pepcid w/ sig improvement in pain  - bentyl prn  - laxatives  - PPI  - Outpt f/u w/ GI for further w/u    3) Atrial Fibrillation  - c/w BB w/ hold parameters; pt bradys down to 40s  - c/w Pradaxa    4) Elevated LFTs  - chronic likely due to MARES    5) DVT ppx - pt on Pradaxa    Pt FC

## 2021-09-27 NOTE — H&P ADULT - NSHPPHYSICALEXAM_GEN_ALL_CORE
PHYSICAL EXAM:    Vital Signs Last 24 Hrs  T(C): 36.8 (27 Sep 2021 04:01), Max: 36.8 (27 Sep 2021 04:01)  T(F): 98.3 (27 Sep 2021 04:01), Max: 98.3 (27 Sep 2021 04:01)  HR: 75 (27 Sep 2021 04:01) (60 - 82)  BP: 110/68 (27 Sep 2021 04:01) (110/68 - 133/70)  BP(mean): --  RR: 20 (27 Sep 2021 04:01) (19 - 22)  SpO2: 96% (27 Sep 2021 04:01) (96% - 100%)    GENERAL: Pt lying in bed comfortably in NAD  HEENT:  Atraumatic, EOMI, PERRL, conjunctiva and sclera clear, MMM  NECK: Supple, No JVD  CHEST/LUNG: Clear to auscultation bilaterally; No rales, rhonchi, wheezing or rubs. Unlabored respirations  HEART: Regular rate and rhythm; No murmurs, rubs, or gallops  ABDOMEN: Bowel sounds present; Soft, Nontender, Nondistended. No guarding or rigidity    EXTREMITIES:  2+ Peripheral Pulses, brisk capillary refill. No clubbing, cyanosis, or edema  NEUROLOGICAL:  Alert & Oriented X3, speech clear. Answers questions appropriately. Full and equal strength B/L upper and lower extremities. No deficits   MSK: FROM x 4 extremities   SKIN: No rashes or lesions PHYSICAL EXAM:    Vital Signs Last 24 Hrs  T(C): 36.8 (27 Sep 2021 04:01), Max: 36.8 (27 Sep 2021 04:01)  T(F): 98.3 (27 Sep 2021 04:01), Max: 98.3 (27 Sep 2021 04:01)  HR: 75 (27 Sep 2021 04:01) (60 - 82)  BP: 110/68 (27 Sep 2021 04:01) (110/68 - 133/70)  BP(mean): --  RR: 20 (27 Sep 2021 04:01) (19 - 22)  SpO2: 96% (27 Sep 2021 04:01) (96% - 100%)    GENERAL: Pt lying in bed comfortably in NAD  HEENT:  Atraumatic, EOMI, MMM  NECK: Supple, +ve JVD  CHEST/LUNG: b/l rales,   HEART: irregular  ABDOMEN: Bowel sounds present; Soft, diffusely tender and distended. No guarding or rigidity    EXTREMITIES:  2+ Peripheral Pulses. 2++ b/l pitting edema  NEUROLOGICAL:  Alert & Oriented X3, speech clear. No focal deficits   MSK: FROM x 4 extremities   SKIN: warm, dry

## 2021-09-27 NOTE — ED PROVIDER NOTE - PHYSICAL EXAMINATION
VITAL SIGNS: I have reviewed nursing notes and confirm.   GEN: Well-developed; well-nourished; in no acute distress. Speaking full sentences.  SKIN: Warm, pink, no rash, no diaphoresis, no cyanosis, well perfused.   HEAD: Normocephalic; atraumatic. No scalp lacerations, no abrasions.  NECK: Supple; non tender.   EYES: Pupils 3mm equal, round, reactive to light and accomodation, conjunctiva and sclera clear. Extra-ocular movements intact bilaterally.  ENT: No nasal discharge; airway clear. Trachea is midline. ORAL: No oropharyngeal exudates or erythema. Normal dentition.  CV: Regular rate and rhythm. S1, S2 normal; no murmurs, gallops, or rubs. No lower extremity pitting edema bilaterally. Capillary refill < 2 seconds throughout. Distal pulses intact 2+ throughout.  RESP: CTA bilaterally. (+) mild bibasilNo wheezes, rales, or rhonchi.   ABD: Normal bowel sounds, soft, non-distended, non-tender, no hepatosplenomegaly. No CVA tenderness bilaterally.  MSK: Normal range of motion and movement of all 4 extremities. No joint or muscular pain throughout. No clubbing.   BACK: No thoracolumbar midline or paravertebral tenderness. No step-offs or obvious deformities.  NEURO: Alert & oriented x 3, Grossly unremarkable. Sensory and motor intact throughout. No focal deficits. Gait: Fluid. Normal speech and coordination.   PSYCH: Cooperative, appropriate. VITAL SIGNS: I have reviewed nursing notes and confirm.   GEN: Well-developed; well-nourished; in no acute distress. Speaking full sentences. Morbidly obese on bipap.  SKIN: Warm, pink, no rash, no diaphoresis, no cyanosis, well perfused.   HEAD: Normocephalic; atraumatic. No scalp lacerations, no abrasions.  NECK: Supple; non tender.   EYES: Pupils 3mm equal, round, reactive to light and accomodation, conjunctiva and sclera clear. Extra-ocular movements intact bilaterally.  ENT: No nasal discharge; airway clear. Trachea is midline. ORAL: No oropharyngeal exudates or erythema. Normal dentition.  CV: Regular rate and rhythm. S1, S2 normal; no murmurs, gallops, or rubs. (+) lower extremity pitting edema bilaterally up to mid shin.. Capillary refill < 2 seconds throughout. Distal pulses intact 2+ throughout.  RESP: CTA bilaterally. (+) mild bibasilar rales. No wheezes or rhonchi.   ABD: Normal bowel sounds, soft, non-distended, non-tender, no hepatosplenomegaly. No CVA tenderness bilaterally.   MSK: Normal range of motion and movement of all 4 extremities. No joint or muscular pain throughout. No clubbing.   BACK: No thoracolumbar midline or paravertebral tenderness. No step-offs or obvious deformities.  NEURO: Alert & oriented x 3, Grossly unremarkable. Sensory and motor intact throughout. No focal deficits  Normal speech and coordination.   PSYCH: Cooperative, appropriate.

## 2021-09-27 NOTE — PROGRESS NOTE ADULT - ASSESSMENT
[place] [id number]  88 y/o Igbo speaking female w/ pmhx  of Atrial Fibrillation on Pradaxa. HTN, HLD, OA, Diastolic HF, pulm admitted to TELE for acute on chronic CHF. Has abdominal pain, possibly secondary to h pylori    CARDIOLOGY  # acute on chronic diastolic heart failure  - secondary to diet noncomplaince (profuse salt intake and water drinking)  - on iv lasix at this time  - lower extremity edema 1+, improved    # afib   - on pradaxa    GASTROENTEROLOGY  # abdominal pain  - possibly secondary to h pylori  - start pt on carafate  - outpt work up with GI    PLAN  - c/w TELE  - diuresis  - strict i/o  - water restriction  - CT abd/pelvis  - possible DC in the am

## 2021-09-27 NOTE — ED PROVIDER NOTE - CLINICAL SUMMARY MEDICAL DECISION MAKING FREE TEXT BOX
Patient presents with signs and symptoms consistent with an acute exacerbation of chronic CHF. Differential diagnosis: alternate cardiopulmonary causes (i.e. ischemia, PE, pneumothorax, pneumonia), other causes of dyspnea (i.e. asthma/reactive airway disease, COPD, flash pulmonary edema, cardiac dysrhythmia); but these are less likely given the history, presentation, and physical exam.  - CBC, CMP, Troponin, Pro-BNP, COVID-19 rule out, EKG, CXR  -  IV diuresis with furosemide, Electrolyte repletion PRN, Supplemental oxygen PRN, BIPAP if worsening respiratory status.  -   Requires admission for IV diuretics and medical optimization.

## 2021-09-28 LAB
ALBUMIN SERPL ELPH-MCNC: 3.2 G/DL — LOW (ref 3.3–5)
ALP SERPL-CCNC: 99 U/L — SIGNIFICANT CHANGE UP (ref 40–120)
ALT FLD-CCNC: 130 U/L — HIGH (ref 12–78)
ANION GAP SERPL CALC-SCNC: 3 MMOL/L — LOW (ref 5–17)
AST SERPL-CCNC: 76 U/L — HIGH (ref 15–37)
BILIRUB SERPL-MCNC: 0.7 MG/DL — SIGNIFICANT CHANGE UP (ref 0.2–1.2)
BUN SERPL-MCNC: 14 MG/DL — SIGNIFICANT CHANGE UP (ref 7–23)
CALCIUM SERPL-MCNC: 9.3 MG/DL — SIGNIFICANT CHANGE UP (ref 8.5–10.1)
CHLORIDE SERPL-SCNC: 107 MMOL/L — SIGNIFICANT CHANGE UP (ref 96–108)
CO2 SERPL-SCNC: 34 MMOL/L — HIGH (ref 22–31)
COVID-19 SPIKE DOMAIN AB INTERP: POSITIVE
COVID-19 SPIKE DOMAIN ANTIBODY RESULT: 182 U/ML — HIGH
CREAT SERPL-MCNC: 0.74 MG/DL — SIGNIFICANT CHANGE UP (ref 0.5–1.3)
CULTURE RESULTS: SIGNIFICANT CHANGE UP
GLUCOSE SERPL-MCNC: 85 MG/DL — SIGNIFICANT CHANGE UP (ref 70–99)
H.PYLORI ANTIBODY IGA: >165 UNITS — HIGH
HCT VFR BLD CALC: 40.1 % — SIGNIFICANT CHANGE UP (ref 34.5–45)
HGB BLD-MCNC: 12.9 G/DL — SIGNIFICANT CHANGE UP (ref 11.5–15.5)
MAGNESIUM SERPL-MCNC: 2.7 MG/DL — HIGH (ref 1.6–2.6)
MCHC RBC-ENTMCNC: 32.1 PG — SIGNIFICANT CHANGE UP (ref 27–34)
MCHC RBC-ENTMCNC: 32.2 GM/DL — SIGNIFICANT CHANGE UP (ref 32–36)
MCV RBC AUTO: 99.8 FL — SIGNIFICANT CHANGE UP (ref 80–100)
NRBC # BLD: 0 /100 WBCS — SIGNIFICANT CHANGE UP (ref 0–0)
PHOSPHATE SERPL-MCNC: 3.8 MG/DL — SIGNIFICANT CHANGE UP (ref 2.5–4.5)
PLATELET # BLD AUTO: 197 K/UL — SIGNIFICANT CHANGE UP (ref 150–400)
POTASSIUM SERPL-MCNC: 3.5 MMOL/L — SIGNIFICANT CHANGE UP (ref 3.5–5.3)
POTASSIUM SERPL-SCNC: 3.5 MMOL/L — SIGNIFICANT CHANGE UP (ref 3.5–5.3)
PROT SERPL-MCNC: 6.3 GM/DL — SIGNIFICANT CHANGE UP (ref 6–8.3)
RBC # BLD: 4.02 M/UL — SIGNIFICANT CHANGE UP (ref 3.8–5.2)
RBC # FLD: 13.2 % — SIGNIFICANT CHANGE UP (ref 10.3–14.5)
SARS-COV-2 IGG+IGM SERPL QL IA: 182 U/ML — HIGH
SARS-COV-2 IGG+IGM SERPL QL IA: POSITIVE
SODIUM SERPL-SCNC: 144 MMOL/L — SIGNIFICANT CHANGE UP (ref 135–145)
SPECIMEN SOURCE: SIGNIFICANT CHANGE UP
WBC # BLD: 4.67 K/UL — SIGNIFICANT CHANGE UP (ref 3.8–10.5)
WBC # FLD AUTO: 4.67 K/UL — SIGNIFICANT CHANGE UP (ref 3.8–10.5)

## 2021-09-28 PROCEDURE — 99221 1ST HOSP IP/OBS SF/LOW 40: CPT

## 2021-09-28 PROCEDURE — 99232 SBSQ HOSP IP/OBS MODERATE 35: CPT

## 2021-09-28 PROCEDURE — 93010 ELECTROCARDIOGRAM REPORT: CPT

## 2021-09-28 RX ORDER — POTASSIUM CHLORIDE 20 MEQ
40 PACKET (EA) ORAL ONCE
Refills: 0 | Status: COMPLETED | OUTPATIENT
Start: 2021-09-28 | End: 2021-09-28

## 2021-09-28 RX ORDER — FUROSEMIDE 40 MG
40 TABLET ORAL
Refills: 0 | Status: DISCONTINUED | OUTPATIENT
Start: 2021-09-28 | End: 2021-09-30

## 2021-09-28 RX ORDER — FUROSEMIDE 40 MG
40 TABLET ORAL ONCE
Refills: 0 | Status: COMPLETED | OUTPATIENT
Start: 2021-09-28 | End: 2021-09-28

## 2021-09-28 RX ADMIN — POLYETHYLENE GLYCOL 3350 17 GRAM(S): 17 POWDER, FOR SOLUTION ORAL at 11:17

## 2021-09-28 RX ADMIN — Medication 81 MILLIGRAM(S): at 11:18

## 2021-09-28 RX ADMIN — DABIGATRAN ETEXILATE MESYLATE 150 MILLIGRAM(S): 150 CAPSULE ORAL at 05:22

## 2021-09-28 RX ADMIN — Medication 1 GRAM(S): at 23:11

## 2021-09-28 RX ADMIN — Medication 40 MILLIEQUIVALENT(S): at 11:17

## 2021-09-28 RX ADMIN — Medication 10 MILLIGRAM(S): at 08:59

## 2021-09-28 RX ADMIN — Medication 40 MILLIGRAM(S): at 17:14

## 2021-09-28 RX ADMIN — Medication 25 MILLIGRAM(S): at 17:14

## 2021-09-28 RX ADMIN — Medication 10 MILLIGRAM(S): at 11:19

## 2021-09-28 RX ADMIN — AMLODIPINE BESYLATE 5 MILLIGRAM(S): 2.5 TABLET ORAL at 05:23

## 2021-09-28 RX ADMIN — Medication 1 GRAM(S): at 11:18

## 2021-09-28 RX ADMIN — Medication 40 MILLIGRAM(S): at 13:04

## 2021-09-28 RX ADMIN — Medication 1 GRAM(S): at 17:14

## 2021-09-28 RX ADMIN — Medication 5 MILLIGRAM(S): at 21:06

## 2021-09-28 RX ADMIN — PANTOPRAZOLE SODIUM 40 MILLIGRAM(S): 20 TABLET, DELAYED RELEASE ORAL at 05:23

## 2021-09-28 RX ADMIN — Medication 10 MILLIGRAM(S): at 17:14

## 2021-09-28 RX ADMIN — Medication 1 GRAM(S): at 05:23

## 2021-09-28 RX ADMIN — ATORVASTATIN CALCIUM 40 MILLIGRAM(S): 80 TABLET, FILM COATED ORAL at 21:06

## 2021-09-28 RX ADMIN — DABIGATRAN ETEXILATE MESYLATE 150 MILLIGRAM(S): 150 CAPSULE ORAL at 17:14

## 2021-09-28 NOTE — CONSULT NOTE ADULT - SUBJECTIVE AND OBJECTIVE BOX
CARDIOLOGY CONSULT NOTE    Patient is a 89y Female with a known history of :    HPI:  88yo lady with a PMH of Atrial Fibrillation on Pradaxa, HTN, HLD, OA, Diastolic HF recently admitted early this month for Pulm HTN, hypoxia, Diastolic HF and abdominal pain presents to the ED c/o worsening shortness of breath, and abdominal pain. Per Daughter who translated, this evening she noted patient to be breathing heavily thus called EMS. Pt reports increasing LE pitting edema, abdominal pain extending up to her chest, as well as bloating, and palpitations. Pt reports diffuse abdominal soreness for weeks (s/p CTA chest A/P on last admx no acute pathology +MARES), as well as early satiety, constipation. Pt denies chest pain, nausea/vomiting, dysuria, fevers, chills, coughs, or dizziness. Of note pt reports she drinks ALOT of water; about 2 gallons a day, also uses alot of salt in her cooking.    Pt brought in on CPAP by EMS eventually tapered down to RA.   Labs sig for BNP 2244  CXR show some pulm edema.   Pt given Lasix w/ improvement    ECG: afib 72bpm  Trop 0.02 -> 0.04  Echo 9/2021: LVEF 50-55%, mod-severe TR  Tele: 13b NSVT on tele      REVIEW OF SYSTEMS:  CONSTITUTIONAL: No fever, weight loss, or fatigue  EYES: No eye pain, visual disturbances, or discharge  ENMT:  No difficulty hearing, tinnitus, vertigo; No sinus or throat pain  NECK: No pain or stiffness  BREASTS: No pain, masses, or nipple discharge  RESPIRATORY: No cough, wheezing, chills or hemoptysis; No shortness of breath  CARDIOVASCULAR: No chest pain, palpitations, dizziness, or leg swelling  GASTROINTESTINAL: No abdominal or epigastric pain. No nausea, vomiting, or hematemesis; No diarrhea or constipation. No melena or hematochezia.  GENITOURINARY: No dysuria, frequency, hematuria, or incontinence  NEUROLOGICAL: No headaches, memory loss, loss of strength, numbness, or tremors  SKIN: No itching, burning, rashes, or lesions   LYMPH NODES: No enlarged glands  ENDOCRINE: No heat or cold intolerance; No hair loss  MUSCULOSKELETAL: No joint pain or swelling; No muscle, back, or extremity pain  PSYCHIATRIC: No depression, anxiety, mood swings, or difficulty sleeping  HEME/LYMPH: No easy bruising, or bleeding gums  ALLERGY AND IMMUNOLOGIC: No hives or eczema    MEDICATIONS  (STANDING):  amLODIPine   Tablet 5 milliGRAM(s) Oral daily  aspirin enteric coated 81 milliGRAM(s) Oral daily  atorvastatin 40 milliGRAM(s) Oral at bedtime  bisacodyl 5 milliGRAM(s) Oral at bedtime  dabigatran 150 milliGRAM(s) Oral every 12 hours  dicyclomine 10 milliGRAM(s) Oral three times a day before meals  furosemide   Injectable 40 milliGRAM(s) IV Push two times a day  metoprolol tartrate 25 milliGRAM(s) Oral two times a day  pantoprazole    Tablet 40 milliGRAM(s) Oral before breakfast  polyethylene glycol 3350 17 Gram(s) Oral daily  sucralfate suspension 1 Gram(s) Oral four times a day    MEDICATIONS  (PRN):  aluminum hydroxide/magnesium hydroxide/simethicone Suspension 30 milliLiter(s) Oral every 4 hours PRN Dyspepsia      ALLERGIES: No Known Allergies      FAMILY HISTORY:  No pertinent family history in first degree relatives        PHYSICAL EXAMINATION:  -----------------------------  T(C): 36.4 (09-28-21 @ 16:59), Max: 36.6 (09-28-21 @ 11:59)  HR: 75 (09-28-21 @ 16:59) (50 - 76)  BP: 116/74 (09-28-21 @ 16:59) (116/74 - 143/80)  RR: 18 (09-28-21 @ 16:59) (16 - 18)  SpO2: 95% (09-28-21 @ 16:59) (95% - 100%)      Constitutional: well developed, normal appearance, well groomed, well nourished, no deformities and no acute distress.   Eyes: the conjunctiva exhibited no abnormalities and the eyelids demonstrated no xanthelasmas.   HEENT: normal oral mucosa, no oral pallor and no oral cyanosis.   Neck: normal jugular venous A waves present, normal jugular venous V waves present and no jugular venous vargas A waves.   Pulmonary: mildly diminished at bases   Cardiovascular: irreg irreg 2/6 SM  Abdomen: soft, non-tender, no hepato-splenomegaly and no abdominal mass palpated.   Musculoskeletal: the gait could not be assessed..   Extremities: no clubbing of the fingernails, no localized cyanosis, no petechial hemorrhages and no ischemic changes.   Skin: normal skin color and pigmentation, no rash, no venous stasis, no skin lesions, no skin ulcer and no xanthoma was observed.   Psychiatric: oriented to person, place, and time, the affect was normal, the mood was normal and not feeling anxious.     LABS:   --------  09-28    144  |  107  |  14  ----------------------------<  85  3.5   |  34<H>  |  0.74    Ca    9.3      28 Sep 2021 06:23  Phos  3.8     09-28  Mg     2.7     09-28    TPro  6.3  /  Alb  3.2<L>  /  TBili  0.7  /  DBili  x   /  AST  76<H>  /  ALT  130<H>  /  AlkPhos  99  09-28                         12.9   4.67  )-----------( 197      ( 28 Sep 2021 06:23 )             40.1     PT/INR - ( 27 Sep 2021 00:24 )   PT: 17.2 sec;   INR: 1.51 ratio         PTT - ( 27 Sep 2021 00:24 )  PTT:41.9 sec    09-27 @ 10:12 CPK total:--, CKMB --, Troponin I - .040 ng/mL  09-27 @ 00:24 CPK total:--, CKMB --, Troponin I - .027 ng/mL          RADIOLOGY:  -----------------    ECG: afib 72bpm    < from: TTE Echo Complete w/o Contrast w/ Doppler (09.12.21 @ 11:03) >  Summary:   1. Left ventricular ejection fraction, by visual estimation, is 50 to 55%.   2. Technically fair study.   3. Normal global left ventricular systolic function.   4. Elevated left ventricular end-diastolic pressure.   5. Normal left ventricular internal cavity size.   6.Spectral Doppler shows pseudonormal pattern of left ventricular myocardial filling (Grade II diastolic dysfunction).   7. There is mild concentric left ventricular hypertrophy.   8. There is no evidence of pericardial effusion.   9. Mild thickening and calcification of the anterior and posterior mitral valve leaflets.  10. Mild to moderate mitral valve regurgitation.  11. Moderate-severe tricuspid regurgitation.  12. Moderate aortic regurgitation.  13. Sclerotic aortic valve with normal opening.  14. Mild pulmonic valve regurgitation.  15. Estimated pulmonary artery systolic pressure is 40.6 mmHg assuming a right atrial pressure of 5 mmHg, which is consistent with mild pulmonary hypertension.    Greg Montes De Oca MD FACC, FASE, FACP  Electronically signed on 9/12/2021 at 4:36:21 PM    < end of copied text >

## 2021-09-28 NOTE — CONSULT NOTE ADULT - ASSESSMENT
88yo lady with a PMH of Atrial Fibrillation on Pradaxa, HTN, HLD, OA, Diastolic HF recently admitted early this month for Pulm HTN, hypoxia, Diastolic HF and abdominal pain presents to the ED c/o worsening shortness of breath, and abdominal pain. Per Daughter who translated, this evening she noted patient to be breathing heavily thus called EMS. Pt reports increasing LE pitting edema, abdominal pain extending up to her chest, as well as bloating, and palpitations. Pt reports diffuse abdominal soreness for weeks (s/p CTA chest A/P on last admx no acute pathology +MARES), as well as early satiety, constipation. Pt denies chest pain, nausea/vomiting, dysuria, fevers, chills, coughs, or dizziness. Of note pt reports she drinks ALOT of water; about 2 gallons a day, also uses alot of salt in her cooking.    Pt brought in on CPAP by EMS eventually tapered down to RA.   Labs sig for BNP 2244  CXR show some pulm edema.   Pt given Lasix w/ improvement    ECG: afib 72bpm  Trop 0.02 -> 0.04  Echo 9/2021: LVEF 50-55%, mod-severe TR  Tele: 13b NSVT on tele    -cont asa/statin  -cont metoprolol BID for BP and for NSVT  -on lasix IV BID... creat stable for now; likely change to daily dosing in AM  -replete lytes aggressively K>4, Mg>2, Ph>3  -will d/w structural team about candidacy for TV clip

## 2021-09-28 NOTE — PROGRESS NOTE ADULT - SUBJECTIVE AND OBJECTIVE BOX
Patient is a 89y old  Female who presents with a chief complaint of Acute on Chronic CHF, Abdominal pain (27 Sep 2021 18:53)    COMS Interactive interpretor [id number]  INTERVAL HPI/OVERNIGHT EVENTS: beats of PVC on monitor  SUBJECTIVE: no dyspnea. breathing better today. no abdominal pain    MEDICATIONS  (STANDING):  amLODIPine   Tablet 5 milliGRAM(s) Oral daily  aspirin enteric coated 81 milliGRAM(s) Oral daily  atorvastatin 40 milliGRAM(s) Oral at bedtime  bisacodyl 5 milliGRAM(s) Oral at bedtime  dabigatran 150 milliGRAM(s) Oral every 12 hours  dicyclomine 10 milliGRAM(s) Oral three times a day before meals  furosemide   Injectable 40 milliGRAM(s) IV Push two times a day  metoprolol tartrate 25 milliGRAM(s) Oral two times a day  pantoprazole    Tablet 40 milliGRAM(s) Oral before breakfast  polyethylene glycol 3350 17 Gram(s) Oral daily  potassium chloride    Tablet ER 40 milliEquivalent(s) Oral once  sucralfate suspension 1 Gram(s) Oral four times a day    MEDICATIONS  (PRN):  aluminum hydroxide/magnesium hydroxide/simethicone Suspension 30 milliLiter(s) Oral every 4 hours PRN Dyspepsia    Allergies    No Known Allergies    Intolerances      REVIEW OF SYSTEMS:  All other systems reviewed and are negative    Vital Signs Last 24 Hrs  T(C): 36.5 (28 Sep 2021 04:52), Max: 36.7 (27 Sep 2021 14:38)  T(F): 97.7 (28 Sep 2021 04:52), Max: 98.1 (27 Sep 2021 14:38)  HR: 53 (28 Sep 2021 08:38) (50 - 78)  BP: 138/88 (28 Sep 2021 04:52) (118/72 - 138/88)  BP(mean): --  RR: 17 (28 Sep 2021 04:52) (16 - 18)  SpO2: 96% (28 Sep 2021 08:38) (93% - 100%)  Daily     Daily   I&O's Summary    27 Sep 2021 07:01  -  28 Sep 2021 07:00  --------------------------------------------------------  IN: 200 mL / OUT: 0 mL / NET: 200 mL      CAPILLARY BLOOD GLUCOSE        PHYSICAL EXAM:  GENERAL: NAD, well-groomed, well-developed  HEAD:  Atraumatic, Normocephalic  EYES: EOMI, PERRLA, conjunctiva and sclera clear  ENMT: No tonsillar erythema, exudates, or enlargement; Moist mucous membranes, Good dentition, No lesions  NECK: Supple, No JVD, Normal thyroid  CHEST/LUNG: Clear to percussion bilaterally; No rales, rhonchi, wheezing, or rubs  HEART: Regular rate and rhythm; No murmurs, rubs, or gallops  ABDOMEN: Soft, Nontender, Nondistended; Bowel sounds present  LYMPH: No lymphadenopathy noted  SKIN: No rashes or lesions    Labs                          12.9   4.67  )-----------( 197      ( 28 Sep 2021 06:23 )             40.1     -    144  |  107  |  14  ----------------------------<  85  3.5   |  34<H>  |  0.74    Ca    9.3      28 Sep 2021 06:23  Phos  3.8       Mg     2.7         TPro  6.3  /  Alb  3.2<L>  /  TBili  0.7  /  DBili  x   /  AST  76<H>  /  ALT  130<H>  /  AlkPhos  99  -    PT/INR - ( 27 Sep 2021 00:24 )   PT: 17.2 sec;   INR: 1.51 ratio         PTT - ( 27 Sep 2021 00:24 )  PTT:41.9 sec  CARDIAC MARKERS ( 27 Sep 2021 10:12 )  .040 ng/mL / x     / x     / x     / x      CARDIAC MARKERS ( 27 Sep 2021 00:24 )  .027 ng/mL / x     / x     / x     / x          Urinalysis Basic - ( 27 Sep 2021 00:24 )    Color: Yellow / Appearance: Clear / S.025 / pH: x  Gluc: x / Ketone: Negative  / Bili: Negative / Urobili: 1 mg/dL   Blood: x / Protein: 100 mg/dL / Nitrite: Negative   Leuk Esterase: Small / RBC: 0-2 /HPF / WBC 3-5   Sq Epi: x / Non Sq Epi: Moderate / Bacteria: Few

## 2021-09-28 NOTE — PROGRESS NOTE ADULT - ASSESSMENT
Ramona interpretor [id number]  89 yo female w/ pmhx of HTN, HLD, osteoarthritis, afib on pradaxa admitted to [facility] for chest pain, concerning for angina    CARDIOLOGY  # decompensated CHF  - CT abd/pelvis - no acute abdomina  - CTA- no PE  - physical examination unremarkable  - EKG shows afib, septal infarct, age indeterminate  - troponin mildly elevated     # pvcs on tele monitor overnight  - asymptomatic?    GASTROENTEROLOGY  # abdominal pain-  - relieved with carafate  - possibly secondary to gastritis +/- chf    PLAN  - c/w TELE, cardiac monitoring  - c/w home medications  - start carafate   - consult CARDIOLOGY   - c/w IV diuresis  - possible DC tomorrow

## 2021-09-29 LAB
ALBUMIN SERPL ELPH-MCNC: 3.3 G/DL — SIGNIFICANT CHANGE UP (ref 3.3–5)
ALP SERPL-CCNC: 96 U/L — SIGNIFICANT CHANGE UP (ref 40–120)
ALT FLD-CCNC: 115 U/L — HIGH (ref 12–78)
ANION GAP SERPL CALC-SCNC: 3 MMOL/L — LOW (ref 5–17)
AST SERPL-CCNC: 61 U/L — HIGH (ref 15–37)
BILIRUB SERPL-MCNC: 0.9 MG/DL — SIGNIFICANT CHANGE UP (ref 0.2–1.2)
BUN SERPL-MCNC: 15 MG/DL — SIGNIFICANT CHANGE UP (ref 7–23)
CALCIUM SERPL-MCNC: 8.3 MG/DL — LOW (ref 8.5–10.1)
CHLORIDE SERPL-SCNC: 109 MMOL/L — HIGH (ref 96–108)
CO2 SERPL-SCNC: 33 MMOL/L — HIGH (ref 22–31)
CREAT SERPL-MCNC: 0.78 MG/DL — SIGNIFICANT CHANGE UP (ref 0.5–1.3)
GLUCOSE SERPL-MCNC: 88 MG/DL — SIGNIFICANT CHANGE UP (ref 70–99)
HCT VFR BLD CALC: 40.5 % — SIGNIFICANT CHANGE UP (ref 34.5–45)
HGB BLD-MCNC: 13.2 G/DL — SIGNIFICANT CHANGE UP (ref 11.5–15.5)
MAGNESIUM SERPL-MCNC: 2.2 MG/DL — SIGNIFICANT CHANGE UP (ref 1.6–2.6)
MCHC RBC-ENTMCNC: 32.1 PG — SIGNIFICANT CHANGE UP (ref 27–34)
MCHC RBC-ENTMCNC: 32.6 GM/DL — SIGNIFICANT CHANGE UP (ref 32–36)
MCV RBC AUTO: 98.5 FL — SIGNIFICANT CHANGE UP (ref 80–100)
NRBC # BLD: 0 /100 WBCS — SIGNIFICANT CHANGE UP (ref 0–0)
PHOSPHATE SERPL-MCNC: 4 MG/DL — SIGNIFICANT CHANGE UP (ref 2.5–4.5)
PLATELET # BLD AUTO: 211 K/UL — SIGNIFICANT CHANGE UP (ref 150–400)
POTASSIUM SERPL-MCNC: 3.3 MMOL/L — LOW (ref 3.5–5.3)
POTASSIUM SERPL-SCNC: 3.3 MMOL/L — LOW (ref 3.5–5.3)
PROT SERPL-MCNC: 6.7 GM/DL — SIGNIFICANT CHANGE UP (ref 6–8.3)
RBC # BLD: 4.11 M/UL — SIGNIFICANT CHANGE UP (ref 3.8–5.2)
RBC # FLD: 13.3 % — SIGNIFICANT CHANGE UP (ref 10.3–14.5)
SODIUM SERPL-SCNC: 145 MMOL/L — SIGNIFICANT CHANGE UP (ref 135–145)
WBC # BLD: 3.78 K/UL — LOW (ref 3.8–10.5)
WBC # FLD AUTO: 3.78 K/UL — LOW (ref 3.8–10.5)

## 2021-09-29 PROCEDURE — 99233 SBSQ HOSP IP/OBS HIGH 50: CPT

## 2021-09-29 RX ORDER — POTASSIUM CHLORIDE 20 MEQ
40 PACKET (EA) ORAL EVERY 4 HOURS
Refills: 0 | Status: COMPLETED | OUTPATIENT
Start: 2021-09-29 | End: 2021-09-29

## 2021-09-29 RX ADMIN — POLYETHYLENE GLYCOL 3350 17 GRAM(S): 17 POWDER, FOR SOLUTION ORAL at 11:19

## 2021-09-29 RX ADMIN — PANTOPRAZOLE SODIUM 40 MILLIGRAM(S): 20 TABLET, DELAYED RELEASE ORAL at 06:00

## 2021-09-29 RX ADMIN — AMLODIPINE BESYLATE 5 MILLIGRAM(S): 2.5 TABLET ORAL at 06:00

## 2021-09-29 RX ADMIN — Medication 40 MILLIEQUIVALENT(S): at 14:00

## 2021-09-29 RX ADMIN — Medication 25 MILLIGRAM(S): at 17:11

## 2021-09-29 RX ADMIN — Medication 40 MILLIGRAM(S): at 06:00

## 2021-09-29 RX ADMIN — Medication 25 MILLIGRAM(S): at 06:00

## 2021-09-29 RX ADMIN — Medication 1 GRAM(S): at 17:10

## 2021-09-29 RX ADMIN — Medication 5 MILLIGRAM(S): at 21:26

## 2021-09-29 RX ADMIN — Medication 40 MILLIEQUIVALENT(S): at 11:18

## 2021-09-29 RX ADMIN — Medication 10 MILLIGRAM(S): at 11:18

## 2021-09-29 RX ADMIN — Medication 40 MILLIGRAM(S): at 17:09

## 2021-09-29 RX ADMIN — DABIGATRAN ETEXILATE MESYLATE 150 MILLIGRAM(S): 150 CAPSULE ORAL at 17:09

## 2021-09-29 RX ADMIN — Medication 81 MILLIGRAM(S): at 11:19

## 2021-09-29 RX ADMIN — ATORVASTATIN CALCIUM 40 MILLIGRAM(S): 80 TABLET, FILM COATED ORAL at 21:26

## 2021-09-29 RX ADMIN — Medication 10 MILLIGRAM(S): at 06:00

## 2021-09-29 RX ADMIN — Medication 40 MILLIEQUIVALENT(S): at 17:10

## 2021-09-29 RX ADMIN — Medication 1 GRAM(S): at 23:16

## 2021-09-29 RX ADMIN — Medication 10 MILLIGRAM(S): at 17:09

## 2021-09-29 RX ADMIN — Medication 1 GRAM(S): at 11:19

## 2021-09-29 RX ADMIN — DABIGATRAN ETEXILATE MESYLATE 150 MILLIGRAM(S): 150 CAPSULE ORAL at 06:00

## 2021-09-29 RX ADMIN — Medication 1 GRAM(S): at 06:00

## 2021-09-29 NOTE — PROGRESS NOTE ADULT - ASSESSMENT
88yo F with a PMH of Atrial Fibrillation on Pradaxa, HTN, HLD, OA, Diastolic HF recently admitted early this month for Pulm HTN, hypoxia, Diastolic HF and abdominal pain presents to the ED c/o worsening shortness of breath, and abdominal pain. Pt reports increasing LE pitting edema, abdominal pain extending up to her chest, as well as bloating, and palpitations. Pt reports diffuse abdominal soreness for weeks (s/p CTA chest A/P on last admx no acute pathology +MARES), as well as early satiety, constipation. Pt denies chest pain, nausea/vomiting, dysuria, fevers, chills, coughs, or dizziness. Of note pt reports she drinks ALOT of water; about 2 gallons a day, also uses alot of salt in her cooking.    Pt brought in on CPAP by EMS eventually tapered down to RA.   Labs sig for BNP 2244  Pt given Lasix w/ improvement    ECG: afib 72bpm  Trop 0.02 -> 0.04  Echo 9/2021: LVEF 50-55%, mod-severe TR, mid-mod MR, mod ASR, Grade II DD, mild TN/pHTN  Tele: 13b NSVT on tele 9/28    -cont on telemetry   -cont asa/statin, LFTs noted to be improving  -cont Pradaxa for AC, monitor h/h  -cont metoprolol BID for BP and for NSVT  -Cont on Lasix IV BID for now, creat stable for now  -replete lytes aggressively K>4, Mg>2, Ph>3  -To d/w structural team about candidacy for TV clip

## 2021-09-29 NOTE — DIETITIAN INITIAL EVALUATION ADULT. - ORAL INTAKE PTA/DIET HISTORY
Pt speaks Igbo, RD used  service to speak to pt., however pt did not want to discuss anything, requested that written information on what to eat, what not to eat to be given to her so that she can give it to her daughter.  RD called and spoke to daughter Ananya via phone, daughter did the shopping/cooking.  Pt & daughter both reported poor appetite for 1 week PTA c pt c/o feeling full.  Daughter was in a rush and stated that she will call RD back once she gets to the hospital.

## 2021-09-29 NOTE — PROGRESS NOTE ADULT - SUBJECTIVE AND OBJECTIVE BOX
89 yo female w/ hx of HTN, HLD, osteoarthritis, OA, A. fib on Pradaxa p/w chest pain & was admitted for acute on chronic diastolic CHF. She is lying in bed in NAD.    MEDICATIONS  (STANDING):  amLODIPine   Tablet 5 milliGRAM(s) Oral daily  aspirin enteric coated 81 milliGRAM(s) Oral daily  atorvastatin 40 milliGRAM(s) Oral at bedtime  bisacodyl 5 milliGRAM(s) Oral at bedtime  dabigatran 150 milliGRAM(s) Oral every 12 hours  dicyclomine 10 milliGRAM(s) Oral three times a day before meals  furosemide   Injectable 40 milliGRAM(s) IV Push two times a day  metoprolol tartrate 25 milliGRAM(s) Oral two times a day  pantoprazole    Tablet 40 milliGRAM(s) Oral before breakfast  polyethylene glycol 3350 17 Gram(s) Oral daily  sucralfate suspension 1 Gram(s) Oral four times a day    MEDICATIONS  (PRN):  aluminum hydroxide/magnesium hydroxide/simethicone Suspension 30 milliLiter(s) Oral every 4 hours PRN Dyspepsia      Allergies    No Known Allergies    Intolerances        Vital Signs Last 24 Hrs  T(C): 36.4 (29 Sep 2021 10:31), Max: 36.4 (29 Sep 2021 05:46)  T(F): 97.6 (29 Sep 2021 10:31), Max: 97.6 (29 Sep 2021 10:31)  HR: 72 (29 Sep 2021 14:27) (56 - 72)  BP: 132/70 (29 Sep 2021 16:05) (115/64 - 145/92)  BP(mean): --  RR: 17 (29 Sep 2021 16:05) (17 - 18)  SpO2: 96% (29 Sep 2021 16:05) (94% - 100%)    PHYSICAL EXAM:  GENERAL: NAD, well-groomed, well-developed  HEAD:  Atraumatic, Normocephalic  EYES: EOMI, PERRLA   NECK: Supple   NERVOUS SYSTEM: Alert   CHEST/LUNG: Dec BS bilaterally   HEART: Regular rate and rhythm; No murmurs, rubs, or gallops  ABDOMEN: Soft, Nontender, Nondistended; Bowel sounds present  EXTREMITIES: mild bilateral LE edema       LABS:                        13.2   3.78  )-----------( 211      ( 29 Sep 2021 07:28 )             40.5     09-29    145  |  109<H>  |  15  ----------------------------<  88  3.3<L>   |  33<H>  |  0.78    Ca    8.3<L>      29 Sep 2021 07:28  Phos  4.0     09-29  Mg     2.2     09-29    TPro  6.7  /  Alb  3.3  /  TBili  0.9  /  DBili  x   /  AST  61<H>  /  ALT  115<H>  /  AlkPhos  96  09-29        CAPILLARY BLOOD GLUCOSE          Culture - Urine (collected 27 Sep 2021 09:06)  Source: Clean Catch Clean Catch (Midstream)  Final Report (28 Sep 2021 22:20):    >=3 organisms. Probable collection contamination.      RADIOLOGY & ADDITIONAL TESTS:    09-29-21 @ 07:01  -  09-29-21 @ 19:57  --------------------------------------------------------  IN:    Oral Fluid: 236 mL  Total IN: 236 mL    OUT:  Total OUT: 0 mL    Total NET: 236 mL

## 2021-09-29 NOTE — PHYSICAL THERAPY INITIAL EVALUATION ADULT - ADDITIONAL COMMENTS
Burkinan  used Yazmin #[id number] to translate. Pt states she lives in a PH with her daughter, no FRANKO remains on main level, however occasionally goes to basement at times. Pt states she is independent with all ADL's and functional mobility using a standard cane outdoors, and no AD indoors.

## 2021-09-29 NOTE — DIETITIAN INITIAL EVALUATION ADULT. - OTHER INFO
Pt's daughter was using salt in cooking PTA, pt was also drinking water excessively as per daughter.  Pt provided written information on heart failure nutrition therapy.  Daughter stated that she will follow nutrition therapy as recommended at home.  As per daughter, pt requires assistance c walking, therefore ? if pt can be weighed daily @ home.  RN reported that pt is eating in fair amounts at present.

## 2021-09-29 NOTE — DIETITIAN INITIAL EVALUATION ADULT. - PERTINENT LABORATORY DATA
09-29 Na145 mmol/L Glu 88 mg/dL K+ 3.3 mmol/L<L> Cr  0.78 mg/dL BUN 15 mg/dL 09-29 Phos 4.0 mg/dL 09-29 Alb 3.3 g/dL09-29  U/L<H> AST 61 U/L<H> Alkaline Phosphatase 96 U/L  09-27-21 @ 14:19 a1c 5.8<H-pre-diabetic range>

## 2021-09-29 NOTE — DIETITIAN INITIAL EVALUATION ADULT. - PHYSCIAL ASSESSMENT
BMI=37.9(09/26), 09/27, 2+ edema of ankles, legs, feet noted -> 09/29, 1+ edema of legs/obese/other (specify)

## 2021-09-29 NOTE — DIETITIAN INITIAL EVALUATION ADULT. - PERTINENT MEDS FT
MEDICATIONS  (STANDING):  amLODIPine   Tablet 5 milliGRAM(s) Oral daily  aspirin enteric coated 81 milliGRAM(s) Oral daily  atorvastatin 40 milliGRAM(s) Oral at bedtime  bisacodyl 5 milliGRAM(s) Oral at bedtime  dabigatran 150 milliGRAM(s) Oral every 12 hours  dicyclomine 10 milliGRAM(s) Oral three times a day before meals  furosemide   Injectable 40 milliGRAM(s) IV Push two times a day  metoprolol tartrate 25 milliGRAM(s) Oral two times a day  pantoprazole    Tablet 40 milliGRAM(s) Oral before breakfast  polyethylene glycol 3350 17 Gram(s) Oral daily  potassium chloride   Powder 40 milliEquivalent(s) Oral every 4 hours  sucralfate suspension 1 Gram(s) Oral four times a day    MEDICATIONS  (PRN):  aluminum hydroxide/magnesium hydroxide/simethicone Suspension 30 milliLiter(s) Oral every 4 hours PRN Dyspepsia

## 2021-09-29 NOTE — PROGRESS NOTE ADULT - SUBJECTIVE AND OBJECTIVE BOX
Patient is a 89y old  Female who presents with a chief complaint of sob and Abdominal pain (29 Sep 2021 10:44)    PAST MEDICAL & SURGICAL HISTORY:  HTN (hypertension)    Hypercholesteremia    Osteoarthritis    Atrial fibrillation    Status post total bilateral knee replacement 1999    History of right shoulder replacement 2013    INTERVAL HISTORY: breathing has improved (spoke with pt utilizing  device)   	  MEDICATIONS:  MEDICATIONS  (STANDING):  amLODIPine   Tablet 5 milliGRAM(s) Oral daily  aspirin enteric coated 81 milliGRAM(s) Oral daily  atorvastatin 40 milliGRAM(s) Oral at bedtime  bisacodyl 5 milliGRAM(s) Oral at bedtime  dabigatran 150 milliGRAM(s) Oral every 12 hours  dicyclomine 10 milliGRAM(s) Oral three times a day before meals  furosemide   Injectable 40 milliGRAM(s) IV Push two times a day  metoprolol tartrate 25 milliGRAM(s) Oral two times a day  pantoprazole    Tablet 40 milliGRAM(s) Oral before breakfast  polyethylene glycol 3350 17 Gram(s) Oral daily  potassium chloride   Powder 40 milliEquivalent(s) Oral every 4 hours  sucralfate suspension 1 Gram(s) Oral four times a day    MEDICATIONS  (PRN):  aluminum hydroxide/magnesium hydroxide/simethicone Suspension 30 milliLiter(s) Oral every 4 hours PRN Dyspepsia    Vitals:  T(F): 97.6 (09-29-21 @ 10:31), Max: 97.8 (09-28-21 @ 11:59)  HR: 68 (09-29-21 @ 10:31) (56 - 76)  BP: 115/64 (09-29-21 @ 10:31) (115/64 - 143/83)  RR: 18 (09-29-21 @ 10:31) (18 - 18)  SpO2: 96% (09-29-21 @ 10:31) (95% - 100%)  Wt(kg): --93.6 kg    09-29 @ 07:01  -  09-29 @ 11:20  --------------------------------------------------------  IN:    Oral Fluid: 236 mL  Total IN: 236 mL    OUT:  Total OUT: 0 mL    Total NET: 236 mL    Weight (kg): 93.9 (09-26 @ 23:22)  BMI (kg/m2): 37.9 (09-26 @ 23:22)    PHYSICAL EXAM:  Neuro: Awake, responsive  CV: S1 S2 irreg +SM  Lungs: bibasilar rales   GI: Soft, BS +, ND, NT  Extremities: + LE edema    TELEMETRY: afib    RADIOLOGY: < from: Xray Chest 1 View- PORTABLE-Urgent (Xray Chest 1 View- PORTABLE-Urgent .) (09.27.21 @ 00:29) >  No active pulmonary disease.    < end of copied text >  < from: CT Abdomen and Pelvis No Cont (09.27.21 @ 20:14) >  Trace pelvic ascites, of unknown etiology.    Trace pleural effusions and cardiomegaly.    < end of copied text >    DIAGNOSTIC TESTING:    [x ] Echocardiogram:< from: TTE Echo Complete w/o Contrast w/ Doppler (09.12.21 @ 11:03) >  Left Ventricle: The left ventricular internal cavity size is normal.  Global LV systolic function was normal. Left ventricular ejection fraction, by visual estimation, is 50 to 55%. Spectral Doppler shows pseudonormal pattern of left ventricular myocardial filling (Grade II diastolic dysfunction). Elevated left ventricular end-diastolic pressure.  Pericardium: There is no evidence of pericardial effusion.  Mitral Valve: Mild thickening and calcification of the anterior and posterior mitral valve leaflets. Mitral leaflet mobility is normal. Mild to moderate mitral valve regurgitation is seen.  Tricuspid Valve: Structurally normal tricuspid valve, with normal leaflet excursion. The tricuspid valve is normal in structure. Moderate-severe tricuspid regurgitation is visualized. Estimated pulmonary artery systolic pressure is 40.6 mmHg assuming a right atrialpressure of 5 mmHg, which is consistent with mild pulmonary hypertension.  Aortic Valve: The aortic valve is trileaflet. Sclerotic aortic valve with normal opening. Moderate aortic valve regurgitation is seen.  Pulmonic Valve: The pulmonic valve was not well visualized. Mild pulmonic valve regurgitation.  Aorta: Aortic root measured at Sinus of Valsalva is normal.  Venous: The inferior vena cava was normal sized, with respiratory size variation greater than 50%.      Summary:   1. Left ventricular ejection fraction, by visual estimation, is 50 to 55%.   2. Technically fair study.   3. Normal global left ventricular systolic function.   4. Elevated left ventricular end-diastolic pressure.   5. Normal left ventricular internal cavity size.   6.Spectral Doppler shows pseudonormal pattern of left ventricular myocardial filling (Grade II diastolic dysfunction).   7. There is mild concentric left ventricular hypertrophy.   8. There is no evidence of pericardial effusion.   9. Mild thickening and calcification of the anterior and posterior mitral valve leaflets.  10. Mild to moderate mitral valve regurgitation.  11. Moderate-severe tricuspid regurgitation.  12. Moderate aortic regurgitation.  13. Sclerotic aortic valve with normal opening.  14. Mild pulmonic valve regurgitation.  15. Estimated pulmonary artery systolic pressure is 40.6 mmHg assuming a right atrial pressure of 5 mmHg, which is consistent with mild pulmonary hypertension.    < end of copied text >    LABS:	 	    CARDIAC MARKERS:  Troponin I, Serum: .040 ng/mL (09-27 @ 10:12)  Troponin I, Serum: .027 ng/mL (09-27 @ 00:24)    29 Sep 2021 07:28    145    |  109    |  15     ----------------------------<  88     3.3     |  33     |  0.78   28 Sep 2021 06:23    144    |  107    |  14     ----------------------------<  85     3.5     |  34     |  0.74   27 Sep 2021 00:24    141    |  109    |  14     ----------------------------<  180    3.9     |  28     |  1.03     Ca    8.3        29 Sep 2021 07:28  Phos  4.0       29 Sep 2021 07:28  Mg     2.2       29 Sep 2021 07:28    TPro  6.7    /  Alb  3.3    /  TBili  0.9    /  DBili  x      /  AST  61     /  ALT  115    /  AlkPhos  96     29 Sep 2021 07:28                        13.2   3.78  )-----------( 211      ( 29 Sep 2021 07:28 )             40.5 ,                       12.9   4.67  )-----------( 197      ( 28 Sep 2021 06:23 )             40.1 ,                       12.1   5.59  )-----------( 190      ( 27 Sep 2021 00:24 )             37.1   proBNP: Serum Pro-Brain Natriuretic Peptide: 2244 pg/mL (09-27 @ 00:24)    INR: 1.51 ratio (09-27 @ 00:24)

## 2021-09-29 NOTE — PROGRESS NOTE ADULT - ASSESSMENT
88 yo female w/ hx of HTN, HLD, osteoarthritis, OA, A. fib on Pradaxa p/w chest pain & was admitted for acute on chronic diastolic CHF.     Acute on chronic diastolic CHF  - CT abd/pelvis showed trace pleural effusions and cardiomegaly  - Echo showed EF 50 to 55%, grade II diastolic dysfunction, mild to moderate mitral valve regurgitation, moderate-severe tricuspid regurgitation & moderate aortic regurgitation  - c/w metoprolol & IV Lasix as per cardio  - cardio will talk /w structural team about candidacy for TV clip    Chest pain   - EKG shows A. fib, septal infarct, age indeterminate  - troponin NEGATIVE x 2  - c/w ASA & Lipitor  - cardio following    Hypokalemia  - replace w/ KCl    NSVT & PVCs on tele monitor overnight  - asymptomatic  - c/w tele monitoring   - cardio following  - c/w metoprolol     Abdominal pain  - relieved with Carafate & Protonix  - possibly secondary to gastritis   - c/w bentyl     HTN  - c/w metoprolol & Norvasc    A. fib  - c/w Pradaxa  - c/w metoprolol     PreDM    - Hgb A1C is 5.8  - patient counselled on lifestyle intervention     Prophylaxis:  DVT: Pradaxa  GI: Protonix    Dispo: home w/ home PT

## 2021-09-29 NOTE — PHYSICAL THERAPY INITIAL EVALUATION ADULT - PERTINENT HX OF CURRENT PROBLEM, REHAB EVAL
89 yo female w/ pmhx of HTN, HLD, osteoarthritis, afib on pradaxa admitted to [facility] for chest pain, concerning for angina

## 2021-09-29 NOTE — DIETITIAN INITIAL EVALUATION ADULT. - FACTORS AFF FOOD INTAKE
c/o feeling of fulness after taking spoonful of food, <50% nutrition needs x 1 week, c/o abdominal pain noted in H &P, etiology unclear/pain/other (specify)

## 2021-09-29 NOTE — PHYSICAL THERAPY INITIAL EVALUATION ADULT - STRENGTHENING, PT EVAL
Patient will improve strength by 1 grade weeks to improve overall functional mobility including gait, transfers, bed mobility and decrease risk of falls.

## 2021-09-29 NOTE — PHYSICAL THERAPY INITIAL EVALUATION ADULT - IMPAIRMENTS FOUND, PT EVAL
aerobic capacity/endurance/decreased midline orientation/ergonomics and body mechanics/gait, locomotion, and balance/muscle strength/ventilation and respiration/gas exchange

## 2021-09-29 NOTE — DIETITIAN INITIAL EVALUATION ADULT. - OTHER CALCULATIONS
Wt: 09/26, 93.9 kg(09/26, pt information wt.)     IBW: 49.8 kg    %IBW: 188%    UBW: 93.8 kg      % UBW: 100%, wt. loss of 0.3 kg since adm noted

## 2021-09-30 ENCOUNTER — TRANSCRIPTION ENCOUNTER (OUTPATIENT)
Age: 86
End: 2021-09-30

## 2021-09-30 VITALS — OXYGEN SATURATION: 95 % | SYSTOLIC BLOOD PRESSURE: 148 MMHG | HEART RATE: 67 BPM | DIASTOLIC BLOOD PRESSURE: 76 MMHG

## 2021-09-30 LAB
ANION GAP SERPL CALC-SCNC: 2 MMOL/L — LOW (ref 5–17)
BUN SERPL-MCNC: 16 MG/DL — SIGNIFICANT CHANGE UP (ref 7–23)
CALCIUM SERPL-MCNC: 8.6 MG/DL — SIGNIFICANT CHANGE UP (ref 8.5–10.1)
CHLORIDE SERPL-SCNC: 109 MMOL/L — HIGH (ref 96–108)
CO2 SERPL-SCNC: 34 MMOL/L — HIGH (ref 22–31)
CREAT SERPL-MCNC: 0.81 MG/DL — SIGNIFICANT CHANGE UP (ref 0.5–1.3)
GLUCOSE SERPL-MCNC: 78 MG/DL — SIGNIFICANT CHANGE UP (ref 70–99)
HCT VFR BLD CALC: 41.5 % — SIGNIFICANT CHANGE UP (ref 34.5–45)
HGB BLD-MCNC: 13.1 G/DL — SIGNIFICANT CHANGE UP (ref 11.5–15.5)
MAGNESIUM SERPL-MCNC: 2.2 MG/DL — SIGNIFICANT CHANGE UP (ref 1.6–2.6)
MCHC RBC-ENTMCNC: 31.5 PG — SIGNIFICANT CHANGE UP (ref 27–34)
MCHC RBC-ENTMCNC: 31.6 GM/DL — LOW (ref 32–36)
MCV RBC AUTO: 99.8 FL — SIGNIFICANT CHANGE UP (ref 80–100)
NRBC # BLD: 0 /100 WBCS — SIGNIFICANT CHANGE UP (ref 0–0)
PHOSPHATE SERPL-MCNC: 3.7 MG/DL — SIGNIFICANT CHANGE UP (ref 2.5–4.5)
PLATELET # BLD AUTO: 224 K/UL — SIGNIFICANT CHANGE UP (ref 150–400)
POTASSIUM SERPL-MCNC: 3.5 MMOL/L — SIGNIFICANT CHANGE UP (ref 3.5–5.3)
POTASSIUM SERPL-SCNC: 3.5 MMOL/L — SIGNIFICANT CHANGE UP (ref 3.5–5.3)
RBC # BLD: 4.16 M/UL — SIGNIFICANT CHANGE UP (ref 3.8–5.2)
RBC # FLD: 13.3 % — SIGNIFICANT CHANGE UP (ref 10.3–14.5)
SODIUM SERPL-SCNC: 145 MMOL/L — SIGNIFICANT CHANGE UP (ref 135–145)
WBC # BLD: 4.44 K/UL — SIGNIFICANT CHANGE UP (ref 3.8–10.5)
WBC # FLD AUTO: 4.44 K/UL — SIGNIFICANT CHANGE UP (ref 3.8–10.5)

## 2021-09-30 PROCEDURE — 99233 SBSQ HOSP IP/OBS HIGH 50: CPT

## 2021-09-30 PROCEDURE — 99239 HOSP IP/OBS DSCHRG MGMT >30: CPT

## 2021-09-30 RX ORDER — PANTOPRAZOLE SODIUM 20 MG/1
1 TABLET, DELAYED RELEASE ORAL
Qty: 30 | Refills: 0

## 2021-09-30 RX ORDER — PANTOPRAZOLE SODIUM 20 MG/1
1 TABLET, DELAYED RELEASE ORAL
Qty: 30 | Refills: 0
Start: 2021-09-30 | End: 2021-10-29

## 2021-09-30 RX ORDER — FUROSEMIDE 40 MG
1 TABLET ORAL
Qty: 0 | Refills: 0 | DISCHARGE

## 2021-09-30 RX ORDER — POTASSIUM CHLORIDE 20 MEQ
40 PACKET (EA) ORAL ONCE
Refills: 0 | Status: COMPLETED | OUTPATIENT
Start: 2021-09-30 | End: 2021-09-30

## 2021-09-30 RX ORDER — FUROSEMIDE 40 MG
40 TABLET ORAL ONCE
Refills: 0 | Status: COMPLETED | OUTPATIENT
Start: 2021-09-30 | End: 2021-09-30

## 2021-09-30 RX ORDER — FUROSEMIDE 40 MG
40 TABLET ORAL
Refills: 0 | Status: DISCONTINUED | OUTPATIENT
Start: 2021-10-01 | End: 2021-09-30

## 2021-09-30 RX ORDER — FUROSEMIDE 40 MG
1 TABLET ORAL
Qty: 30 | Refills: 0
Start: 2021-09-30 | End: 2021-10-29

## 2021-09-30 RX ADMIN — DABIGATRAN ETEXILATE MESYLATE 150 MILLIGRAM(S): 150 CAPSULE ORAL at 06:09

## 2021-09-30 RX ADMIN — DABIGATRAN ETEXILATE MESYLATE 150 MILLIGRAM(S): 150 CAPSULE ORAL at 17:28

## 2021-09-30 RX ADMIN — Medication 40 MILLIGRAM(S): at 06:09

## 2021-09-30 RX ADMIN — AMLODIPINE BESYLATE 5 MILLIGRAM(S): 2.5 TABLET ORAL at 06:09

## 2021-09-30 RX ADMIN — Medication 25 MILLIGRAM(S): at 06:09

## 2021-09-30 RX ADMIN — Medication 10 MILLIGRAM(S): at 06:09

## 2021-09-30 RX ADMIN — PANTOPRAZOLE SODIUM 40 MILLIGRAM(S): 20 TABLET, DELAYED RELEASE ORAL at 06:09

## 2021-09-30 RX ADMIN — Medication 40 MILLIEQUIVALENT(S): at 17:29

## 2021-09-30 RX ADMIN — Medication 10 MILLIGRAM(S): at 11:33

## 2021-09-30 RX ADMIN — Medication 25 MILLIGRAM(S): at 17:28

## 2021-09-30 RX ADMIN — Medication 1 GRAM(S): at 17:28

## 2021-09-30 RX ADMIN — Medication 81 MILLIGRAM(S): at 11:33

## 2021-09-30 RX ADMIN — Medication 40 MILLIGRAM(S): at 18:21

## 2021-09-30 RX ADMIN — Medication 1 GRAM(S): at 11:33

## 2021-09-30 RX ADMIN — Medication 1 GRAM(S): at 06:09

## 2021-09-30 RX ADMIN — Medication 10 MILLIGRAM(S): at 17:28

## 2021-09-30 NOTE — DISCHARGE NOTE PROVIDER - NSDCMRMEDTOKEN_GEN_ALL_CORE_FT
amLODIPine 5 mg oral tablet: 1 tab(s) orally once a day  aspirin 81 mg oral delayed release tablet: 1 tab(s) orally once a day  atorvastatin 40 mg oral tablet: 1 tab(s) orally once a day  furosemide 20 mg oral tablet: 1 tab(s) orally once a day  isosorbide mononitrate 30 mg oral tablet, extended release: 1 tab(s) orally once a day (in the morning)  metoclopramide 5 mg oral tablet: 1 tab(s) orally 3 times a day (before meals)  Metoprolol Succinate ER 50 mg oral tablet, extended release: 1 tab(s) orally once a day  pantoprazole 40 mg oral delayed release tablet: 1 tab(s) orally once a day (before a meal)  Pradaxa 150 mg oral capsule: 1 cap(s) orally every 12 hours  theophylline 400 mg/24 hours oral tablet, extended release: 1 tab(s) orally once a day   amLODIPine 5 mg oral tablet: 1 tab(s) orally once a day  aspirin 81 mg oral delayed release tablet: 1 tab(s) orally once a day  atorvastatin 40 mg oral tablet: 1 tab(s) orally once a day  furosemide 40 mg oral tablet: 1 tab(s) orally once a day   isosorbide mononitrate 30 mg oral tablet, extended release: 1 tab(s) orally once a day (in the morning)  metoclopramide 5 mg oral tablet: 1 tab(s) orally 3 times a day (before meals)  Metoprolol Succinate ER 50 mg oral tablet, extended release: 1 tab(s) orally once a day  pantoprazole 40 mg oral delayed release tablet: 1 tab(s) orally once a day (before a meal)  Pradaxa 150 mg oral capsule: 1 cap(s) orally every 12 hours  theophylline 400 mg/24 hours oral tablet, extended release: 1 tab(s) orally once a day

## 2021-09-30 NOTE — DISCHARGE NOTE PROVIDER - HOSPITAL COURSE
88 yo female w/ hx of HTN, HLD, osteoarthritis, OA, A. fib on Pradaxa p/w chest pain & was admitted for acute on chronic diastolic CHF. CT abd/pelvis showed trace pleural effusions and cardiomegaly. Echo showed EF 50 to 55%, grade II diastolic dysfunction, mild to moderate mitral valve regurgitation, moderate-severe tricuspid regurgitation & moderate aortic regurgitation. Pt improved w/ metoprolol & IV Lasix. Cardio will talk /w structural team about candidacy for TV clip    Tele showed NSVT & PVCs. Her chest pain resolved. Troponin was NEGATIVE x 2. She also reported abdominal pain that was relieved with Carafate & Protonix and was  possibly secondary to gastritis. Of note, she was found to have PreDM   w/ Hgb A1C of 5.8 and was counselled on lifestyle intervention.    Discharge time: 43 minutes     88 yo female w/ hx of HTN, HLD, osteoarthritis, OA, A. fib on Pradaxa p/w chest pain & was admitted for acute on chronic diastolic CHF. CT abd/pelvis showed trace pleural effusions and cardiomegaly. Echo showed EF 50 to 55%, grade II diastolic dysfunction, mild to moderate mitral valve regurgitation, moderate-severe tricuspid regurgitation & moderate aortic regurgitation. Pt improved w/ metoprolol & IV Lasix. Tele showed NSVT & PVCs. Her chest pain resolved. Troponin was NEGATIVE x 2. She also reported abdominal pain that was relieved with Carafate & Protonix and was  possibly secondary to gastritis. Of note, she was found to have PreDM w/ Hgb A1C of 5.8 and was counselled on lifestyle intervention. Pt will follow up w/ cardio about candidacy for TV clip & pulmonologist for a sleep study.    Discharge time: 43 minutes

## 2021-09-30 NOTE — DISCHARGE NOTE PROVIDER - CARE PROVIDER_API CALL
Your PMD,   Phone: (   )    -  Fax: (   )    -  Follow Up Time:     Ezio Ramsey)  Cardiology; Interventional Cardiology  [address] [place] [id number]  Phone: [phone]  Fax: [phone]  Follow Up Time: 1 week   Ezio Ramsey)  Cardiology; Interventional Cardiology  [address], [place] [id number]  Phone: [phone]  Fax: [phone]  Follow Up Time: 1 week    Your PMD,   Phone: (   )    -  Fax: (   )    -  Follow Up Time:     Aleksey Muller)  Medicine  [address], [address]  [place] [address]  Phone: [phone]  Fax: [phone]  Follow Up Time:

## 2021-09-30 NOTE — DISCHARGE NOTE PROVIDER - PROVIDER TOKENS
FREE:[LAST:[Your PMD],PHONE:[(   )    -],FAX:[(   )    -]],PROVIDER:[TOKEN:[[id number]],FOLLOWUP:[1 week]] PROVIDER:[TOKEN:[[id number]],FOLLOWUP:[1 week]],FREE:[LAST:[Your PMD],PHONE:[(   )    -],FAX:[(   )    -]],PROVIDER:[TOKEN:[[id number]]]

## 2021-09-30 NOTE — PROGRESS NOTE ADULT - REASON FOR ADMISSION
Acute on Chronic CHF, Abdominal pain

## 2021-09-30 NOTE — DISCHARGE NOTE PROVIDER - CARE PROVIDERS DIRECT ADDRESSES
,DirectAddress_Unknown,[email or web address],DirectAddress_Unknown,[email or web address]

## 2021-09-30 NOTE — PROGRESS NOTE ADULT - SUBJECTIVE AND OBJECTIVE BOX
Patient is a 89y old  Female who presents with a chief complaint of Acute on Chronic CHF, Abdominal pain (30 Sep 2021 11:58)      PAST MEDICAL & SURGICAL HISTORY:  HTN (hypertension)    Hypercholesteremia    Osteoarthritis    Lesion of lip    Atrial fibrillation    Status post total bilateral knee replacement  1999    History of right shoulder replacement  2013      INTERVAL HISTORY:  	  MEDICATIONS:  MEDICATIONS  (STANDING):  amLODIPine   Tablet 5 milliGRAM(s) Oral daily  aspirin enteric coated 81 milliGRAM(s) Oral daily  atorvastatin 40 milliGRAM(s) Oral at bedtime  bisacodyl 5 milliGRAM(s) Oral at bedtime  dabigatran 150 milliGRAM(s) Oral every 12 hours  dicyclomine 10 milliGRAM(s) Oral three times a day before meals  furosemide   Injectable 40 milliGRAM(s) IV Push two times a day  metoprolol tartrate 25 milliGRAM(s) Oral two times a day  pantoprazole    Tablet 40 milliGRAM(s) Oral before breakfast  polyethylene glycol 3350 17 Gram(s) Oral daily  potassium chloride   Powder 40 milliEquivalent(s) Oral once  sucralfate suspension 1 Gram(s) Oral four times a day    MEDICATIONS  (PRN):  aluminum hydroxide/magnesium hydroxide/simethicone Suspension 30 milliLiter(s) Oral every 4 hours PRN Dyspepsia      Vitals:  T(F): 97.9 (09-30-21 @ 10:59), Max: 97.9 (09-30-21 @ 10:59)  HR: 58 (09-30-21 @ 10:59) (58 - 72)  BP: 124/69 (09-30-21 @ 10:59) (124/69 - 152/84)  RR: 17 (09-30-21 @ 10:59) (17 - 18)  SpO2: 96% (09-30-21 @ 10:59) (94% - 100%)  Wt(kg): --    09-29 @ 07:01  -  09-30 @ 07:00  --------------------------------------------------------  IN:    Oral Fluid: 656 mL  Total IN: 656 mL    OUT:  Total OUT: 0 mL    Total NET: 656 mL    09-30 @ 07:01  -  09-30 @ 13:18  --------------------------------------------------------  IN:    Oral Fluid: 236 mL  Total IN: 236 mL    OUT:  Total OUT: 0 mL  Total NET: 236 mL    PHYSICAL EXAM:  Neuro: Awake, responsive  CV: S1 S2 RRR  Lungs: CTABL  GI: Soft, BS +, ND, NT  Extremities: No edema    TELEMETRY:   	    ECG:  	    RADIOLOGY: < from: Xray Chest 1 View- PORTABLE-Urgent (Xray Chest 1 View- PORTABLE-Urgent .) (09.27.21 @ 00:29) >  No active pulmonary disease.    < end of copied text >  < from: CT Abdomen and Pelvis No Cont (09.27.21 @ 20:14) >  Trace pelvic ascites, of unknown etiology.    Trace pleural effusions and cardiomegaly.    < end of copied text >    DIAGNOSTIC TESTING:    [x ] Echocardiogram: < from: TTE Echo Complete w/o Contrast w/ Doppler (09.12.21 @ 11:03) >  Left Ventricle: The left ventricular internal cavity size is normal.  Global LV systolic function was normal. Left ventricular ejection fraction, by visual estimation, is 50 to 55%. Spectral Doppler shows pseudonormal pattern of left ventricular myocardial filling (Grade II diastolic dysfunction). Elevated left ventricular end-diastolic pressure.  Pericardium: There is no evidence of pericardial effusion.  Mitral Valve: Mild thickening and calcification of the anterior and posterior mitral valve leaflets. Mitral leaflet mobility is normal. Mild to moderate mitral valve regurgitation is seen.  Tricuspid Valve: Structurally normal tricuspid valve, with normal leaflet excursion. The tricuspid valve is normal in structure. Moderate-severe tricuspid regurgitation is visualized. Estimated pulmonary artery systolic pressure is 40.6 mmHg assuming a right atrialpressure of 5 mmHg, which is consistent with mild pulmonary hypertension.  Aortic Valve: The aortic valve is trileaflet. Sclerotic aortic valve with normal opening. Moderate aortic valve regurgitation is seen.  Pulmonic Valve: The pulmonic valve was not well visualized. Mild pulmonic valve regurgitation.  Aorta: Aortic root measured at Sinus of Valsalva is normal.  Venous: The inferior vena cava was normal sized, with respiratory size variation greater than 50%.      Summary:   1. Left ventricular ejection fraction, by visual estimation, is 50 to 55%.   2. Technically fair study.   3. Normal global left ventricular systolic function.   4. Elevated left ventricular end-diastolic pressure.   5. Normal left ventricular internal cavity size.   6.Spectral Doppler shows pseudonormal pattern of left ventricular myocardial filling (Grade II diastolic dysfunction).   7. There is mild concentric left ventricular hypertrophy.   8. There is no evidence of pericardial effusion.   9. Mild thickening and calcification of the anterior and posterior mitral valve leaflets.  10. Mild to moderate mitral valve regurgitation.  11. Moderate-severe tricuspid regurgitation.  12. Moderate aortic regurgitation.  13. Sclerotic aortic valve with normal opening.  14. Mild pulmonic valve regurgitation.  15. Estimated pulmonary artery systolic pressure is 40.6 mmHg assuming a right atrial pressure of 5 mmHg, which is consistent with mild pulmonary hypertension.    < end of copied text >    LABS:	 	  30 Sep 2021 06:53    145    |  109    |  16     ----------------------------<  78     3.5     |  34     |  0.81   29 Sep 2021 07:28    145    |  109    |  15     ----------------------------<  88     3.3     |  33     |  0.78   28 Sep 2021 06:23    144    |  107    |  14     ----------------------------<  85     3.5     |  34     |  0.74     Ca    8.6        30 Sep 2021 06:53  Phos  3.7       30 Sep 2021 06:53  Mg     2.2       30 Sep 2021 06:53    TPro  6.7    /  Alb  3.3    /  TBili  0.9    /  DBili  x      /  AST  61     /  ALT  115    /  AlkPhos  96     29 Sep 2021 07:28                          13.1   4.44  )-----------( 224      ( 30 Sep 2021 06:53 )             41.5 ,                       13.2   3.78  )-----------( 211      ( 29 Sep 2021 07:28 )             40.5 ,                       12.9   4.67  )-----------( 197      ( 28 Sep 2021 06:23 )             40.1   proBNP: Serum Pro-Brain Natriuretic Peptide: 2244 pg/mL                 Patient is a 89y old  Female who presents with a chief complaint of Acute on Chronic CHF, Abdominal pain (30 Sep 2021 11:58)      PAST MEDICAL & SURGICAL HISTORY:  HTN (hypertension)    Hypercholesteremia    Osteoarthritis    Lesion of lip    Atrial fibrillation    Status post total bilateral knee replacement  1999    History of right shoulder replacement  2013      INTERVAL HISTORY: no overnight event, patient states feeling better.   	  MEDICATIONS:  MEDICATIONS  (STANDING):  amLODIPine   Tablet 5 milliGRAM(s) Oral daily  aspirin enteric coated 81 milliGRAM(s) Oral daily  atorvastatin 40 milliGRAM(s) Oral at bedtime  bisacodyl 5 milliGRAM(s) Oral at bedtime  dabigatran 150 milliGRAM(s) Oral every 12 hours  dicyclomine 10 milliGRAM(s) Oral three times a day before meals  furosemide   Injectable 40 milliGRAM(s) IV Push two times a day  metoprolol tartrate 25 milliGRAM(s) Oral two times a day  pantoprazole    Tablet 40 milliGRAM(s) Oral before breakfast  polyethylene glycol 3350 17 Gram(s) Oral daily  potassium chloride   Powder 40 milliEquivalent(s) Oral once  sucralfate suspension 1 Gram(s) Oral four times a day    MEDICATIONS  (PRN):  aluminum hydroxide/magnesium hydroxide/simethicone Suspension 30 milliLiter(s) Oral every 4 hours PRN Dyspepsia      Vitals:  T(F): 97.9 (09-30-21 @ 10:59), Max: 97.9 (09-30-21 @ 10:59)  HR: 58 (09-30-21 @ 10:59) (58 - 72)  BP: 124/69 (09-30-21 @ 10:59) (124/69 - 152/84)  RR: 17 (09-30-21 @ 10:59) (17 - 18)  SpO2: 96% (09-30-21 @ 10:59) (94% - 100%)  Wt(kg): --    09-29 @ 07:01  -  09-30 @ 07:00  --------------------------------------------------------  IN:    Oral Fluid: 656 mL  Total IN: 656 mL    OUT:  Total OUT: 0 mL    Total NET: 656 mL    09-30 @ 07:01  -  09-30 @ 13:18  --------------------------------------------------------  IN:    Oral Fluid: 236 mL  Total IN: 236 mL    OUT:  Total OUT: 0 mL  Total NET: 236 mL    PHYSICAL EXAM:  Neuro: Awake, responsive  CV: S1 S2 irregular, + soft murmur  Lungs: diminished BS at bases, + Rales left LL  GI: Soft, obese, BS +, ND, NT  Extremities: Trace edema b/l LEs. no calf tenderness    TELEMETRY: A fib   	    RADIOLOGY: < from: Xray Chest 1 View- PORTABLE-Urgent (Xray Chest 1 View- PORTABLE-Urgent .) (09.27.21 @ 00:29) >  No active pulmonary disease.    < end of copied text >  < from: CT Abdomen and Pelvis No Cont (09.27.21 @ 20:14) >  Trace pelvic ascites, of unknown etiology.    Trace pleural effusions and cardiomegaly.    < end of copied text >    DIAGNOSTIC TESTING:    [x ] Echocardiogram: < from: TTE Echo Complete w/o Contrast w/ Doppler (09.12.21 @ 11:03) >  Left Ventricle: The left ventricular internal cavity size is normal.  Global LV systolic function was normal. Left ventricular ejection fraction, by visual estimation, is 50 to 55%. Spectral Doppler shows pseudonormal pattern of left ventricular myocardial filling (Grade II diastolic dysfunction). Elevated left ventricular end-diastolic pressure.  Pericardium: There is no evidence of pericardial effusion.  Mitral Valve: Mild thickening and calcification of the anterior and posterior mitral valve leaflets. Mitral leaflet mobility is normal. Mild to moderate mitral valve regurgitation is seen.  Tricuspid Valve: Structurally normal tricuspid valve, with normal leaflet excursion. The tricuspid valve is normal in structure. Moderate-severe tricuspid regurgitation is visualized. Estimated pulmonary artery systolic pressure is 40.6 mmHg assuming a right atrialpressure of 5 mmHg, which is consistent with mild pulmonary hypertension.  Aortic Valve: The aortic valve is trileaflet. Sclerotic aortic valve with normal opening. Moderate aortic valve regurgitation is seen.  Pulmonic Valve: The pulmonic valve was not well visualized. Mild pulmonic valve regurgitation.  Aorta: Aortic root measured at Sinus of Valsalva is normal.  Venous: The inferior vena cava was normal sized, with respiratory size variation greater than 50%.      Summary:   1. Left ventricular ejection fraction, by visual estimation, is 50 to 55%.   2. Technically fair study.   3. Normal global left ventricular systolic function.   4. Elevated left ventricular end-diastolic pressure.   5. Normal left ventricular internal cavity size.   6.Spectral Doppler shows pseudonormal pattern of left ventricular myocardial filling (Grade II diastolic dysfunction).   7. There is mild concentric left ventricular hypertrophy.   8. There is no evidence of pericardial effusion.   9. Mild thickening and calcification of the anterior and posterior mitral valve leaflets.  10. Mild to moderate mitral valve regurgitation.  11. Moderate-severe tricuspid regurgitation.  12. Moderate aortic regurgitation.  13. Sclerotic aortic valve with normal opening.  14. Mild pulmonic valve regurgitation.  15. Estimated pulmonary artery systolic pressure is 40.6 mmHg assuming a right atrial pressure of 5 mmHg, which is consistent with mild pulmonary hypertension.    < end of copied text >    LABS:	 	  30 Sep 2021 06:53    145    |  109    |  16     ----------------------------<  78     3.5     |  34     |  0.81   29 Sep 2021 07:28    145    |  109    |  15     ----------------------------<  88     3.3     |  33     |  0.78   28 Sep 2021 06:23    144    |  107    |  14     ----------------------------<  85     3.5     |  34     |  0.74     Ca    8.6        30 Sep 2021 06:53  Phos  3.7       30 Sep 2021 06:53  Mg     2.2       30 Sep 2021 06:53    TPro  6.7    /  Alb  3.3    /  TBili  0.9    /  DBili  x      /  AST  61     /  ALT  115    /  AlkPhos  96     29 Sep 2021 07:28                          13.1   4.44  )-----------( 224      ( 30 Sep 2021 06:53 )             41.5 ,                       13.2   3.78  )-----------( 211      ( 29 Sep 2021 07:28 )             40.5 ,                       12.9   4.67  )-----------( 197      ( 28 Sep 2021 06:23 )             40.1   proBNP: Serum Pro-Brain Natriuretic Peptide: 2244 pg/mL                 Patient is a 89y old  Female who presents with a chief complaint of SOB Abdominal pain (30 Sep 2021 11:58)      PAST MEDICAL & SURGICAL HISTORY:  HTN (hypertension)    Hypercholesteremia    Osteoarthritis    Lesion of lip    Atrial fibrillation    Status post total bilateral knee replacement 1999    History of right shoulder replacement 2013    INTERVAL HISTORY: no overnight event, patient states feeling better.   	  MEDICATIONS:  MEDICATIONS  (STANDING):  amLODIPine   Tablet 5 milliGRAM(s) Oral daily  aspirin enteric coated 81 milliGRAM(s) Oral daily  atorvastatin 40 milliGRAM(s) Oral at bedtime  bisacodyl 5 milliGRAM(s) Oral at bedtime  dabigatran 150 milliGRAM(s) Oral every 12 hours  dicyclomine 10 milliGRAM(s) Oral three times a day before meals  furosemide   Injectable 40 milliGRAM(s) IV Push two times a day  metoprolol tartrate 25 milliGRAM(s) Oral two times a day  pantoprazole    Tablet 40 milliGRAM(s) Oral before breakfast  polyethylene glycol 3350 17 Gram(s) Oral daily  potassium chloride   Powder 40 milliEquivalent(s) Oral once  sucralfate suspension 1 Gram(s) Oral four times a day    MEDICATIONS  (PRN):  aluminum hydroxide/magnesium hydroxide/simethicone Suspension 30 milliLiter(s) Oral every 4 hours PRN Dyspepsia    Vitals:  T(F): 97.9 (09-30-21 @ 10:59), Max: 97.9 (09-30-21 @ 10:59)  HR: 58 (09-30-21 @ 10:59) (58 - 72)  BP: 124/69 (09-30-21 @ 10:59) (124/69 - 152/84)  RR: 17 (09-30-21 @ 10:59) (17 - 18)  SpO2: 96% (09-30-21 @ 10:59) (94% - 100%)    09-29 @ 07:01  -  09-30 @ 07:00  --------------------------------------------------------  IN:    Oral Fluid: 656 mL  Total IN: 656 mL    OUT:  Total OUT: 0 mL    Total NET: 656 mL    09-30 @ 07:01 - 09-30 @ 13:18  --------------------------------------------------------  IN:    Oral Fluid: 236 mL  Total IN: 236 mL    OUT:  Total OUT: 0 mL  Total NET: 236 mL    PHYSICAL EXAM:  Neuro: Awake, responsive  CV: S1 S2 irregular, + soft murmur  Lungs: diminished BS at bases, + Rales left LL  GI: Soft, obese, BS +, ND, NT  Extremities: Trace edema b/l LEs. no calf tenderness    TELEMETRY: A fib   	    RADIOLOGY: < from: Xray Chest 1 View- PORTABLE-Urgent (Xray Chest 1 View- PORTABLE-Urgent .) (09.27.21 @ 00:29) >  No active pulmonary disease.    < end of copied text >  < from: CT Abdomen and Pelvis No Cont (09.27.21 @ 20:14) >  Trace pelvic ascites, of unknown etiology.    Trace pleural effusions and cardiomegaly.    < end of copied text >    DIAGNOSTIC TESTING:    [x ] Echocardiogram: < from: TTE Echo Complete w/o Contrast w/ Doppler (09.12.21 @ 11:03) >  Left Ventricle: The left ventricular internal cavity size is normal.  Global LV systolic function was normal. Left ventricular ejection fraction, by visual estimation, is 50 to 55%. Spectral Doppler shows pseudonormal pattern of left ventricular myocardial filling (Grade II diastolic dysfunction). Elevated left ventricular end-diastolic pressure.  Pericardium: There is no evidence of pericardial effusion.  Mitral Valve: Mild thickening and calcification of the anterior and posterior mitral valve leaflets. Mitral leaflet mobility is normal. Mild to moderate mitral valve regurgitation is seen.  Tricuspid Valve: Structurally normal tricuspid valve, with normal leaflet excursion. The tricuspid valve is normal in structure. Moderate-severe tricuspid regurgitation is visualized. Estimated pulmonary artery systolic pressure is 40.6 mmHg assuming a right atrialpressure of 5 mmHg, which is consistent with mild pulmonary hypertension.  Aortic Valve: The aortic valve is trileaflet. Sclerotic aortic valve with normal opening. Moderate aortic valve regurgitation is seen.  Pulmonic Valve: The pulmonic valve was not well visualized. Mild pulmonic valve regurgitation.  Aorta: Aortic root measured at Sinus of Valsalva is normal.  Venous: The inferior vena cava was normal sized, with respiratory size variation greater than 50%.      Summary:   1. Left ventricular ejection fraction, by visual estimation, is 50 to 55%.   2. Technically fair study.   3. Normal global left ventricular systolic function.   4. Elevated left ventricular end-diastolic pressure.   5. Normal left ventricular internal cavity size.   6.Spectral Doppler shows pseudonormal pattern of left ventricular myocardial filling (Grade II diastolic dysfunction).   7. There is mild concentric left ventricular hypertrophy.   8. There is no evidence of pericardial effusion.   9. Mild thickening and calcification of the anterior and posterior mitral valve leaflets.  10. Mild to moderate mitral valve regurgitation.  11. Moderate-severe tricuspid regurgitation.  12. Moderate aortic regurgitation.  13. Sclerotic aortic valve with normal opening.  14. Mild pulmonic valve regurgitation.  15. Estimated pulmonary artery systolic pressure is 40.6 mmHg assuming a right atrial pressure of 5 mmHg, which is consistent with mild pulmonary hypertension.    < end of copied text >    LABS:	 	  30 Sep 2021 06:53    145    |  109    |  16     ----------------------------<  78     3.5     |  34     |  0.81   29 Sep 2021 07:28    145    |  109    |  15     ----------------------------<  88     3.3     |  33     |  0.78   28 Sep 2021 06:23    144    |  107    |  14     ----------------------------<  85     3.5     |  34     |  0.74     Ca    8.6        30 Sep 2021 06:53  Phos  3.7       30 Sep 2021 06:53  Mg     2.2       30 Sep 2021 06:53    TPro  6.7    /  Alb  3.3    /  TBili  0.9    /  DBili  x      /  AST  61     /  ALT  115    /  AlkPhos  96     29 Sep 2021 07:28                          13.1   4.44  )-----------( 224      ( 30 Sep 2021 06:53 )             41.5 ,                       13.2   3.78  )-----------( 211      ( 29 Sep 2021 07:28 )             40.5 ,                       12.9   4.67  )-----------( 197      ( 28 Sep 2021 06:23 )             40.1   proBNP: Serum Pro-Brain Natriuretic Peptide: 2244 pg/mL

## 2021-09-30 NOTE — DISCHARGE NOTE NURSING/CASE MANAGEMENT/SOCIAL WORK - PATIENT PORTAL LINK FT
You can access the FollowMyHealth Patient Portal offered by [facility] by registering at the following website: [email or web address]. By joining [facility] FollowMyHealth portal, you will also be able to view your health information using other applications (apps) compatible with our system.

## 2021-09-30 NOTE — PROGRESS NOTE ADULT - ASSESSMENT
89yo F with a PMH of Atrial Fibrillation on Pradaxa, HTN, HLD, OA, Diastolic HF recently admitted early this month for Pulm HTN, hypoxia, Diastolic HF and abdominal pain presents to the ED c/o worsening shortness of breath, and abdominal pain. Pt reports increasing LE pitting edema, abdominal pain extending up to her chest, as well as bloating, and palpitations. Pt reports diffuse abdominal soreness for weeks (s/p CTA chest A/P on last admx no acute pathology +MARES), as well as early satiety, constipation. Pt denies chest pain, nausea/vomiting, dysuria, fevers, chills, coughs, or dizziness. Of note pt reports she drinks ALOT of water; about 2 gallons a day, also uses a lot of salt in her cooking.    Pt brought in on CPAP by EMS eventually tapered down to RA.   Labs sig for BNP 2244  Pt given Lasix w/ improvement    ECG: afib 72bpm  Trop 0.02 -> 0.04  Echo 9/2021: LVEF 50-55%, mod-severe TR, mid-mod MR, mod ASR, Grade II DD, mild [place]/pHTN  Tele: 13b NSVT on tele 9/28    -cont on telemetry   -cont asa/statin, LFTs noted to be improving  -cont Pradaxa for AC, monitor h/h  -cont metoprolol BID for BP and for NSVT  -Cont on Lasix IV BID for now, creat stable for now  -replete lytes aggressively K>4, Mg>2, Ph>3  -To d/w structural team about candidacy for TV clip 89yo F with a PMH of Atrial Fibrillation on Pradaxa, HTN, HLD, OA, Diastolic HF recently admitted early this month for Pulm HTN, hypoxia, Diastolic HF and abdominal pain presents to the ED c/o worsening shortness of breath, and abdominal pain. Pt reports increasing LE pitting edema, abdominal pain extending up to her chest, as well as bloating, and palpitations. Pt reports diffuse abdominal soreness for weeks (s/p CTA chest A/P on last admx no acute pathology +MARES), as well as early satiety, constipation. Pt denies chest pain, nausea/vomiting, dysuria, fevers, chills, coughs, or dizziness. Of note pt reports she drinks ALOT of water; about 2 gallons a day, also uses a lot of salt in her cooking.    Pt brought in on CPAP by EMS eventually tapered down to RA. well tolerating  Labs sig for BNP 2244  Pt given Lasix w/ improvement    ECG: afib 72bpm  Trop 0.02 -> 0.04  Echo 9/2021: LVEF 50-55%, mod-severe TR, mid-mod MR, mod ASR, Grade II DD, mild [place]/pHTN  Tele: 13b NSVT on tele 9/28    -cont on telemetry   -cont asa/statin, LFTs noted to be improving  -cont Pradaxa for AC, monitor h/h  -cont metoprolol BID for BP and for NSVT  -Currently on Lasix IV BID, creat stable for now  -Will switch to po lasix   -replete lytes aggressively K>4, Mg>2, Ph>3  -To d/w structural team about candidacy for TV clip 88yo F with a PMH of Atrial Fibrillation on Pradaxa, HTN, HLD, OA, Diastolic HF recently admitted early this month for Pulm HTN, hypoxia, Diastolic HF and abdominal pain presents to the ED c/o worsening shortness of breath, and abdominal pain. Pt reports increasing LE pitting edema, abdominal pain extending up to her chest, as well as bloating, and palpitations. Pt reports diffuse abdominal soreness for weeks (s/p CTA chest A/P on last admx no acute pathology +MARES), as well as early satiety, constipation. Pt denies chest pain, nausea/vomiting, dysuria, fevers, chills, coughs, or dizziness. Of note pt reports she drinks ALOT of water; about 2 gallons a day, also uses a lot of salt in her cooking.    Pt brought in on CPAP by EMS eventually tapered down to RA. well tolerating  Labs sig for BNP 2244  Pt given Lasix w/ improvement    ECG: afib 72bpm  Trop 0.02 -> 0.04  Echo 9/2021: LVEF 50-55%, mod-severe TR, mid-mod MR, mod ASR, Grade II DD, mild [place]/pHTN    -cont asa/statin, LFTs noted to be improving  -cont Pradaxa for AC, monitor h/h  -cont metoprolol BID   -Currently on Lasix IV BID, can be switched to oral, creat stable for now  -replete lytes aggressively K>4, Mg>2, Ph>3  -Discussed with family regarding TV clipping, family is interested in evaluating her for TV clipping, will follow up with Dr. Mullen within a week of discharge as outpatient and pt will be referred to Valve clinic

## 2021-10-01 ENCOUNTER — NON-APPOINTMENT (OUTPATIENT)
Age: 86
End: 2021-10-01

## 2021-10-05 DIAGNOSIS — R73.03 PREDIABETES: ICD-10-CM

## 2021-10-05 DIAGNOSIS — I48.91 UNSPECIFIED ATRIAL FIBRILLATION: ICD-10-CM

## 2021-10-05 DIAGNOSIS — I49.3 VENTRICULAR PREMATURE DEPOLARIZATION: ICD-10-CM

## 2021-10-05 DIAGNOSIS — I50.33 ACUTE ON CHRONIC DIASTOLIC (CONGESTIVE) HEART FAILURE: ICD-10-CM

## 2021-10-05 DIAGNOSIS — Z96.653 PRESENCE OF ARTIFICIAL KNEE JOINT, BILATERAL: ICD-10-CM

## 2021-10-05 DIAGNOSIS — Z79.01 LONG TERM (CURRENT) USE OF ANTICOAGULANTS: ICD-10-CM

## 2021-10-05 DIAGNOSIS — K29.70 GASTRITIS, UNSPECIFIED, WITHOUT BLEEDING: ICD-10-CM

## 2021-10-05 DIAGNOSIS — E78.5 HYPERLIPIDEMIA, UNSPECIFIED: ICD-10-CM

## 2021-10-05 DIAGNOSIS — Z96.611 PRESENCE OF RIGHT ARTIFICIAL SHOULDER JOINT: ICD-10-CM

## 2021-10-05 DIAGNOSIS — Z79.82 LONG TERM (CURRENT) USE OF ASPIRIN: ICD-10-CM

## 2021-10-05 DIAGNOSIS — I11.0 HYPERTENSIVE HEART DISEASE WITH HEART FAILURE: ICD-10-CM

## 2021-10-05 DIAGNOSIS — I27.20 PULMONARY HYPERTENSION, UNSPECIFIED: ICD-10-CM

## 2021-10-05 DIAGNOSIS — K75.81 NONALCOHOLIC STEATOHEPATITIS (NASH): ICD-10-CM

## 2021-10-05 DIAGNOSIS — E66.9 OBESITY, UNSPECIFIED: ICD-10-CM

## 2021-10-05 DIAGNOSIS — I47.2 VENTRICULAR TACHYCARDIA: ICD-10-CM

## 2021-10-05 DIAGNOSIS — E87.6 HYPOKALEMIA: ICD-10-CM

## 2021-10-05 DIAGNOSIS — M19.90 UNSPECIFIED OSTEOARTHRITIS, UNSPECIFIED SITE: ICD-10-CM

## 2021-10-07 ENCOUNTER — APPOINTMENT (OUTPATIENT)
Dept: CARDIOLOGY | Facility: CLINIC | Age: 86
End: 2021-10-07
Payer: MEDICARE

## 2021-10-07 ENCOUNTER — APPOINTMENT (OUTPATIENT)
Dept: CARDIOLOGY | Facility: CLINIC | Age: 86
End: 2021-10-07

## 2021-10-07 ENCOUNTER — NON-APPOINTMENT (OUTPATIENT)
Age: 86
End: 2021-10-07

## 2021-10-07 VITALS
OXYGEN SATURATION: 97 % | SYSTOLIC BLOOD PRESSURE: 120 MMHG | HEART RATE: 77 BPM | WEIGHT: 205 LBS | HEIGHT: 65 IN | DIASTOLIC BLOOD PRESSURE: 60 MMHG | BODY MASS INDEX: 34.16 KG/M2

## 2021-10-07 DIAGNOSIS — I51.9 HEART DISEASE, UNSPECIFIED: ICD-10-CM

## 2021-10-07 DIAGNOSIS — R06.01 ORTHOPNEA: ICD-10-CM

## 2021-10-07 DIAGNOSIS — Z00.00 ENCOUNTER FOR GENERAL ADULT MEDICAL EXAMINATION W/OUT ABNORMAL FINDINGS: ICD-10-CM

## 2021-10-07 DIAGNOSIS — R06.00 DYSPNEA, UNSPECIFIED: ICD-10-CM

## 2021-10-07 PROCEDURE — 99214 OFFICE O/P EST MOD 30 MIN: CPT

## 2021-10-07 PROCEDURE — 93000 ELECTROCARDIOGRAM COMPLETE: CPT

## 2021-10-07 RX ORDER — ISOSORBIDE MONONITRATE 30 MG/1
30 TABLET, EXTENDED RELEASE ORAL
Refills: 0 | Status: ACTIVE | COMMUNITY

## 2021-10-07 RX ORDER — ASPIRIN 81 MG
81 TABLET, DELAYED RELEASE (ENTERIC COATED) ORAL
Refills: 0 | Status: DISCONTINUED | COMMUNITY
End: 2021-10-07

## 2021-10-07 RX ORDER — MULTIVITAMIN
TABLET ORAL
Refills: 0 | Status: ACTIVE | COMMUNITY

## 2021-10-07 RX ORDER — THEOPHYLLINE 400 MG/1
400 TABLET, EXTENDED RELEASE ORAL
Refills: 0 | Status: COMPLETED | COMMUNITY

## 2021-10-07 RX ORDER — CALCIUM CARBONATE/VITAMIN D3 600MG-5MCG
600-5 TABLET ORAL
Refills: 0 | Status: ACTIVE | COMMUNITY

## 2021-10-07 NOTE — PHYSICAL EXAM
[Well Developed] : well developed [Well Nourished] : well nourished [No Acute Distress] : no acute distress [Normal Conjunctiva] : normal conjunctiva [Normal Venous Pressure] : normal venous pressure [No Carotid Bruit] : no carotid bruit [Normal S1, S2] : normal S1, S2 [No Murmur] : no murmur [No Rub] : no rub [No Gallop] : no gallop [Clear Lung Fields] : clear lung fields [Good Air Entry] : good air entry [No Respiratory Distress] : no respiratory distress  [Soft] : abdomen soft [Non Tender] : non-tender [No Masses/organomegaly] : no masses/organomegaly [Normal Bowel Sounds] : normal bowel sounds [Normal Gait] : normal gait [No Cyanosis] : no cyanosis [No Clubbing] : no clubbing [No Varicosities] : no varicosities [Edema ___] : edema [unfilled] [Venous stasis] : venous stasis [No Rash] : no rash [No Skin Lesions] : no skin lesions [Moves all extremities] : moves all extremities [No Focal Deficits] : no focal deficits [Normal Speech] : normal speech [Alert and Oriented] : alert and oriented [Normal memory] : normal memory [de-identified] : chronic stasis changes bilateral LE's

## 2021-10-07 NOTE — HISTORY OF PRESENT ILLNESS
[FreeTextEntry1] : 89 yo female w/ hx of HTN, HLD, osteoarthritis, OA, A. fib on Pradaxa p/w chest pain & was admitted for acute on chronic diastolic CHF. CT abd/pelvis showed trace pleural effusions and cardiomegaly. Echo showed EF 50 to 55%, grade II diastolic dysfunction, mild to moderate mitral valve regurgitation, moderate-severe tricuspid regurgitation & moderate aortic regurgitation. Pt improved w/ metoprolol & IV Lasix. Tele showed NSVT & PVCs. Her chest pain resolved. Troponin was NEGATIVE x 2. She also reported abdominal pain that was relieved with Carafate & Protonix and was  possibly secondary to gastritis. \par \par Since discharge, patient still has significant orthopnea and PND.  Although her lower extremity edema has resolved she still complains of diffuse chest muscle pain from frequent cough and phlegm production especially at night.  She has not responded much to famotidine therapy and still may have a significant element of reflux in addition to her heart failure.\par \par As per daughter, patient is compliant with medications.  Denies any significant palpitations or syncopal episodes.  Has significant dyspnea on exertion.

## 2021-10-07 NOTE — CARDIOLOGY SUMMARY
[de-identified] : 10/7/2021,Atrial fibrillation , -Poor R-wave progression -nonspecific -consider old anterior infarct, - Nonspecific T-abnormality..

## 2021-10-08 ENCOUNTER — APPOINTMENT (OUTPATIENT)
Age: 86
End: 2021-10-08
Payer: MEDICARE

## 2021-10-08 VITALS
HEART RATE: 65 BPM | OXYGEN SATURATION: 98 % | TEMPERATURE: 98.2 F | SYSTOLIC BLOOD PRESSURE: 108 MMHG | DIASTOLIC BLOOD PRESSURE: 64 MMHG | RESPIRATION RATE: 20 BRPM

## 2021-10-08 DIAGNOSIS — I07.1 RHEUMATIC TRICUSPID INSUFFICIENCY: ICD-10-CM

## 2021-10-08 DIAGNOSIS — K21.9 GASTRO-ESOPHAGEAL REFLUX DISEASE W/OUT ESOPHAGITIS: ICD-10-CM

## 2021-10-08 PROCEDURE — 99349 HOME/RES VST EST MOD MDM 40: CPT

## 2021-10-08 RX ORDER — METOPROLOL TARTRATE 50 MG/1
50 TABLET, FILM COATED ORAL
Refills: 0 | Status: DISCONTINUED | COMMUNITY
End: 2021-10-08

## 2021-10-08 NOTE — REVIEW OF SYSTEMS
[Lower Ext Edema] : lower extremity edema [Dyspnea on Exertion] : dyspnea on exertion [Heartburn] : heartburn [Negative] : Heme/Lymph

## 2021-10-08 NOTE — PLAN
[FreeTextEntry1] : -f/u with PCP on 10/11/21\par -[employer] TCM STARS teaching: Enforced with patient need for daily weights. Advised to call for an increase of greater than 2 lbs. in a day or 5 pounds in a week. Adhere to low salt diet and educated patient on foods that should be avoided such as processed or fast food. Continue medications as ordered. Follow up with Cardiologist. Contact information given, patient advised to call with any questions/concerns. \par

## 2021-10-08 NOTE — PHYSICAL EXAM
[No Acute Distress] : no acute distress [Well-Appearing] : well-appearing [Normal Voice/Communication] : normal voice/communication [Normal Sclera/Conjunctiva] : normal sclera/conjunctiva [Normal Outer Ear/Nose] : the outer ears and nose were normal in appearance [No JVD] : no jugular venous distention [Supple] : supple [No Respiratory Distress] : no respiratory distress  [No Accessory Muscle Use] : no accessory muscle use [Normal Rhythm/Effort] : normal respiratory rhythm and effort [Rales / Crackles Bilateral Midlung Fields] : crackles were heard over both midlung fields [Rales / Crackles Bilateral Bases] : crackles were heard over both bases [Normal Rate] : normal rate  [Normal S1, S2] : normal S1 and S2 [Pedal Pulses Present] : the pedal pulses are present [No Extremity Clubbing/Cyanosis] : no extremity clubbing/cyanosis [Soft] : abdomen soft [Non Tender] : non-tender [Normal Bowel Sounds] : normal bowel sounds [No CVA Tenderness] : no CVA  tenderness [No Joint Swelling] : no joint swelling [No Rash] : no rash [Normal Affect] : the affect was normal [Alert and Oriented x3] : oriented to person, place, and time [de-identified] : + [de-identified] : b/l LE edema

## 2021-10-08 NOTE — HISTORY OF PRESENT ILLNESS
[Post-hospitalization from ___ Hospital] : Post-hospitalization from [unfilled] Hospital [Admitted on: ___] : The patient was admitted on [unfilled] [Discharged on ___] : discharged on [unfilled] [Discharge Summary] : discharge summary [Pertinent Labs] : pertinent labs [Radiology Findings] : radiology findings [Discharge Med List] : discharge medication list [Med Reconciliation] : medication reconciliation has been completed [FreeTextEntry3] : [facility] Follow up: Heart Failure [FreeTextEntry2] : As per [facility]'s hospital course authored on 9/30/21:\par "89 yo female w/ hx of HTN, HLD, osteoarthritis, OA, A. fib on Pradaxa p/w chest pain & was admitted for acute on chronic diastolic CHF. CT abd/pelvis showed trace pleural effusions and cardiomegaly. Echo showed EF 50 to 55%, grade II diastolic dysfunction, mild to moderate mitral valve regurgitation, moderate-severe tricuspid regurgitation & moderate aortic regurgitation. Pt improved w/ metoprolol & IV Lasix. Tele showed NSVT & PVCs. Her chest pain resolved. Troponin was NEGATIVE x 2. She also reported abdominal pain that was relieved with Carafate & Protonix and was  possibly secondary to gastritis. Of note, she was found to have PreDM w/ Hgb A1C of 5.8 and was counselled on lifestyle intervention. Pt will follow up w/ cardio about candidacy for TV clip & pulmonologist for a sleep study."\par \par \par Ms. Styles seen in the home today. Daughter and granddaughter present for translation. Furosemide increased to 60mg at Dr. Lima's appointment on 10/7/21. Weight down 3 lbs from yesterday. +MCFADDEN which resolves at rest. Sleeps with one pillow in her bed. Appetite is good, daughter prepares food and medications. Home care RN and P/T have been in the home. \par \par

## 2021-10-08 NOTE — HEALTH RISK ASSESSMENT
[No] : No [Patient refused screening] : Patient refused screening [de-identified] : homemade Citizen of [place] foods [Low Salt Diet] : low salt [General Adherence] : general adherence [Cultural] : cultural [With Family] : lives with family

## 2021-10-08 NOTE — COUNSELING
[Fall prevention counseling provided] : Fall prevention counseling provided [Adequate lighting] : Adequate lighting [Use proper foot wear] : Use proper foot wear

## 2021-11-22 ENCOUNTER — APPOINTMENT (OUTPATIENT)
Dept: CARDIOTHORACIC SURGERY | Facility: CLINIC | Age: 86
End: 2021-11-22

## 2021-11-22 NOTE — HISTORY OF PRESENT ILLNESS
[FreeTextEntry1] : Ms. Styles is referred to us today by Dr. Lima, for evaluation of her Tricuspid Regurgitation. She has a PMHx of GERD, DeQuervain's Tenosynovitis, HLD, HTN, and Diastolic Heart failure. She had a recent admission at [facility] for Heart Failure, where she was treated with diuresis and discharged.

## 2021-11-22 NOTE — CARDIOLOGY SUMMARY
[de-identified] : AFib [de-identified] : 9/12/2021: Mild to Moderate MR, Moderate AI, Moderate to severe TR, EF 50-55%, Stage II Diastolic Heart Failure.

## 2021-11-24 ENCOUNTER — APPOINTMENT (OUTPATIENT)
Dept: CARDIOLOGY | Facility: CLINIC | Age: 86
End: 2021-11-24

## 2021-12-08 ENCOUNTER — EMERGENCY (EMERGENCY)
Facility: HOSPITAL | Age: 86
LOS: 0 days | Discharge: HOME | End: 2021-12-08
Attending: EMERGENCY MEDICINE
Payer: MEDICARE

## 2021-12-08 VITALS
DIASTOLIC BLOOD PRESSURE: 82 MMHG | RESPIRATION RATE: 19 BRPM | HEIGHT: 62 IN | WEIGHT: 175.05 LBS | OXYGEN SATURATION: 100 % | HEART RATE: 82 BPM | TEMPERATURE: 98 F | SYSTOLIC BLOOD PRESSURE: 125 MMHG

## 2021-12-08 DIAGNOSIS — I48.91 UNSPECIFIED ATRIAL FIBRILLATION: ICD-10-CM

## 2021-12-08 DIAGNOSIS — Z96.611 PRESENCE OF RIGHT ARTIFICIAL SHOULDER JOINT: Chronic | ICD-10-CM

## 2021-12-08 DIAGNOSIS — Z20.822 CONTACT WITH AND (SUSPECTED) EXPOSURE TO COVID-19: ICD-10-CM

## 2021-12-08 DIAGNOSIS — E78.5 HYPERLIPIDEMIA, UNSPECIFIED: ICD-10-CM

## 2021-12-08 DIAGNOSIS — Z96.653 PRESENCE OF ARTIFICIAL KNEE JOINT, BILATERAL: Chronic | ICD-10-CM

## 2021-12-08 DIAGNOSIS — Z96.653 PRESENCE OF ARTIFICIAL KNEE JOINT, BILATERAL: ICD-10-CM

## 2021-12-08 DIAGNOSIS — M19.90 UNSPECIFIED OSTEOARTHRITIS, UNSPECIFIED SITE: ICD-10-CM

## 2021-12-08 DIAGNOSIS — Z96.611 PRESENCE OF RIGHT ARTIFICIAL SHOULDER JOINT: ICD-10-CM

## 2021-12-08 DIAGNOSIS — I10 ESSENTIAL (PRIMARY) HYPERTENSION: ICD-10-CM

## 2021-12-08 LAB
FLUAV AG NPH QL: SIGNIFICANT CHANGE UP
FLUBV AG NPH QL: SIGNIFICANT CHANGE UP
SARS-COV-2 RNA SPEC QL NAA+PROBE: SIGNIFICANT CHANGE UP

## 2021-12-08 PROCEDURE — 99282 EMERGENCY DEPT VISIT SF MDM: CPT | Mod: CS

## 2021-12-08 NOTE — ED PROVIDER NOTE - NSFOLLOWUPINSTRUCTIONS_ED_ALL_ED_FT
COVID-19: What Your Test Results Mean      If you test positive for COVID-19    Take steps to help prevent the spread of COVID-19     Stay home.      Do not leave your home, except to get medical care. Do not visit public areas.    Get rest and stay hydrated.     Take over-the-counter medicines, such as acetaminophen, to help you feel better.    Stay in touch with your doctor.     Separate yourself from other people.      As much as possible, stay in a specific room and away from other people and pets in your home.      If you test negative for COVID-19    •You probably were not infected at the time your sample was collected.       •However, that does not mean you will not get sick.      •It is possible that you were very early in your infection when your sample was collected and that you could test positive later.      A negative test result does not mean you won't get sick later.     cdc.gov/coronavirus      05/30/2020    This information is not intended to replace advice given to you by your health care provider. Make sure you discuss any questions you have with your health care provider.      Document Revised: 09/08/2021 Document Reviewed: 09/08/2021

## 2021-12-08 NOTE — ED PROVIDER NOTE - PATIENT PORTAL LINK FT
You can access the FollowMyHealth Patient Portal offered by [facility] by registering at the following website: [email or web address]. By joining [facility]’s FollowMyHealth portal, you will also be able to view your health information using other applications (apps) compatible with our system.

## 2021-12-08 NOTE — ED ADULT NURSE NOTE - OBJECTIVE STATEMENT
Son at bedside. Patient presents today for COVID test for travel. denies sick contacts, cold symptoms. PMH HTN.

## 2021-12-08 NOTE — ED PROVIDER NOTE - OBJECTIVE STATEMENT
89 yo F w/PMH of HTN, a-fib presents to the ED for COVID test for travel. Denies any sick contacts. Denies fever/chills, cough, SOB, CP, adnominal pain or N/V/D. Pt is vaccinated x3 against COVID w/Pfizer.

## 2021-12-08 NOTE — ED ADULT NURSE NOTE - NSIMPLEMENTINTERV_GEN_ALL_ED
Implemented All Universal Safety Interventions:  [place] to call system. Call bell, personal items and telephone within reach. Instruct patient to call for assistance. Room bathroom lighting operational. Non-slip footwear when patient is off stretcher. Physically safe environment: no spills, clutter or unnecessary equipment. Stretcher in lowest position, wheels locked, appropriate side rails in place. Implemented All Fall with Harm Risk Interventions:  [place] to call system. Call bell, personal items and telephone within reach. Instruct patient to call for assistance. Room bathroom lighting operational. Non-slip footwear when patient is off stretcher. Physically safe environment: no spills, clutter or unnecessary equipment. Stretcher in lowest position, wheels locked, appropriate side rails in place. Provide visual cue, wrist band, yellow gown, etc. Monitor gait and stability. Monitor for mental status changes and reorient to person, place, and time. Review medications for side effects contributing to fall risk. Reinforce activity limits and safety measures with patient and family. Provide visual clues: red socks.

## 2022-04-02 ENCOUNTER — INPATIENT (INPATIENT)
Facility: HOSPITAL | Age: 87
LOS: 2 days | Discharge: HOME | End: 2022-04-05
Attending: INTERNAL MEDICINE | Admitting: INTERNAL MEDICINE
Payer: MEDICARE

## 2022-04-02 VITALS
RESPIRATION RATE: 22 BRPM | DIASTOLIC BLOOD PRESSURE: 66 MMHG | WEIGHT: 179.9 LBS | SYSTOLIC BLOOD PRESSURE: 108 MMHG | HEART RATE: 86 BPM | OXYGEN SATURATION: 100 % | HEIGHT: 62 IN | TEMPERATURE: 98 F

## 2022-04-02 DIAGNOSIS — Z96.653 PRESENCE OF ARTIFICIAL KNEE JOINT, BILATERAL: Chronic | ICD-10-CM

## 2022-04-02 DIAGNOSIS — Z96.611 PRESENCE OF RIGHT ARTIFICIAL SHOULDER JOINT: Chronic | ICD-10-CM

## 2022-04-02 LAB
ALBUMIN SERPL ELPH-MCNC: 3.6 G/DL — SIGNIFICANT CHANGE UP (ref 3.3–5)
ALP SERPL-CCNC: 100 U/L — SIGNIFICANT CHANGE UP (ref 40–120)
ALT FLD-CCNC: 45 U/L — SIGNIFICANT CHANGE UP (ref 12–78)
ANION GAP SERPL CALC-SCNC: 2 MMOL/L — LOW (ref 5–17)
APPEARANCE UR: CLEAR — SIGNIFICANT CHANGE UP
AST SERPL-CCNC: 35 U/L — SIGNIFICANT CHANGE UP (ref 15–37)
BASOPHILS # BLD AUTO: 0.03 K/UL — SIGNIFICANT CHANGE UP (ref 0–0.2)
BASOPHILS NFR BLD AUTO: 0.7 % — SIGNIFICANT CHANGE UP (ref 0–2)
BILIRUB SERPL-MCNC: 0.8 MG/DL — SIGNIFICANT CHANGE UP (ref 0.2–1.2)
BILIRUB UR-MCNC: NEGATIVE — SIGNIFICANT CHANGE UP
BUN SERPL-MCNC: 30 MG/DL — HIGH (ref 7–23)
CALCIUM SERPL-MCNC: 9.2 MG/DL — SIGNIFICANT CHANGE UP (ref 8.5–10.1)
CHLORIDE SERPL-SCNC: 106 MMOL/L — SIGNIFICANT CHANGE UP (ref 96–108)
CO2 SERPL-SCNC: 32 MMOL/L — HIGH (ref 22–31)
COLOR SPEC: YELLOW — SIGNIFICANT CHANGE UP
CREAT SERPL-MCNC: 1.36 MG/DL — HIGH (ref 0.5–1.3)
DIFF PNL FLD: NEGATIVE — SIGNIFICANT CHANGE UP
EGFR: 37 ML/MIN/1.73M2 — LOW
EOSINOPHIL # BLD AUTO: 0.15 K/UL — SIGNIFICANT CHANGE UP (ref 0–0.5)
EOSINOPHIL NFR BLD AUTO: 3.4 % — SIGNIFICANT CHANGE UP (ref 0–6)
FLUAV AG NPH QL: SIGNIFICANT CHANGE UP
FLUBV AG NPH QL: SIGNIFICANT CHANGE UP
GLUCOSE SERPL-MCNC: 111 MG/DL — HIGH (ref 70–99)
GLUCOSE UR QL: NEGATIVE MG/DL — SIGNIFICANT CHANGE UP
HCT VFR BLD CALC: 37.4 % — SIGNIFICANT CHANGE UP (ref 34.5–45)
HGB BLD-MCNC: 12.4 G/DL — SIGNIFICANT CHANGE UP (ref 11.5–15.5)
IMM GRANULOCYTES NFR BLD AUTO: 0.2 % — SIGNIFICANT CHANGE UP (ref 0–1.5)
KETONES UR-MCNC: NEGATIVE — SIGNIFICANT CHANGE UP
LEUKOCYTE ESTERASE UR-ACNC: NEGATIVE — SIGNIFICANT CHANGE UP
LIDOCAIN IGE QN: 148 U/L — SIGNIFICANT CHANGE UP (ref 73–393)
LYMPHOCYTES # BLD AUTO: 1.39 K/UL — SIGNIFICANT CHANGE UP (ref 1–3.3)
LYMPHOCYTES # BLD AUTO: 31.2 % — SIGNIFICANT CHANGE UP (ref 13–44)
MCHC RBC-ENTMCNC: 33.2 G/DL — SIGNIFICANT CHANGE UP (ref 32–36)
MCHC RBC-ENTMCNC: 33.9 PG — SIGNIFICANT CHANGE UP (ref 27–34)
MCV RBC AUTO: 102.2 FL — HIGH (ref 80–100)
MONOCYTES # BLD AUTO: 0.51 K/UL — SIGNIFICANT CHANGE UP (ref 0–0.9)
MONOCYTES NFR BLD AUTO: 11.4 % — SIGNIFICANT CHANGE UP (ref 2–14)
NEUTROPHILS # BLD AUTO: 2.37 K/UL — SIGNIFICANT CHANGE UP (ref 1.8–7.4)
NEUTROPHILS NFR BLD AUTO: 53.1 % — SIGNIFICANT CHANGE UP (ref 43–77)
NITRITE UR-MCNC: NEGATIVE — SIGNIFICANT CHANGE UP
NRBC # BLD: 0 /100 WBCS — SIGNIFICANT CHANGE UP (ref 0–0)
NT-PROBNP SERPL-SCNC: 1748 PG/ML — HIGH (ref 0–450)
PH UR: 6.5 — SIGNIFICANT CHANGE UP (ref 5–8)
PLATELET # BLD AUTO: 183 K/UL — SIGNIFICANT CHANGE UP (ref 150–400)
POTASSIUM SERPL-MCNC: 4 MMOL/L — SIGNIFICANT CHANGE UP (ref 3.5–5.3)
POTASSIUM SERPL-SCNC: 4 MMOL/L — SIGNIFICANT CHANGE UP (ref 3.5–5.3)
PROT SERPL-MCNC: 7.3 GM/DL — SIGNIFICANT CHANGE UP (ref 6–8.3)
PROT UR-MCNC: NEGATIVE MG/DL — SIGNIFICANT CHANGE UP
RBC # BLD: 3.66 M/UL — LOW (ref 3.8–5.2)
RBC # FLD: 13.2 % — SIGNIFICANT CHANGE UP (ref 10.3–14.5)
SARS-COV-2 RNA SPEC QL NAA+PROBE: SIGNIFICANT CHANGE UP
SODIUM SERPL-SCNC: 140 MMOL/L — SIGNIFICANT CHANGE UP (ref 135–145)
SP GR SPEC: 1 — LOW (ref 1.01–1.02)
TROPONIN I, HIGH SENSITIVITY RESULT: 26.3 NG/L — SIGNIFICANT CHANGE UP
UROBILINOGEN FLD QL: NEGATIVE MG/DL — SIGNIFICANT CHANGE UP
WBC # BLD: 4.46 K/UL — SIGNIFICANT CHANGE UP (ref 3.8–10.5)
WBC # FLD AUTO: 4.46 K/UL — SIGNIFICANT CHANGE UP (ref 3.8–10.5)

## 2022-04-02 PROCEDURE — 93010 ELECTROCARDIOGRAM REPORT: CPT

## 2022-04-02 PROCEDURE — 99285 EMERGENCY DEPT VISIT HI MDM: CPT

## 2022-04-02 PROCEDURE — 71045 X-RAY EXAM CHEST 1 VIEW: CPT | Mod: 26

## 2022-04-02 RX ORDER — RIVAROXABAN 15 MG-20MG
15 KIT ORAL DAILY
Refills: 0 | Status: DISCONTINUED | OUTPATIENT
Start: 2022-04-02 | End: 2022-04-05

## 2022-04-02 RX ORDER — PANTOPRAZOLE SODIUM 20 MG/1
40 TABLET, DELAYED RELEASE ORAL
Refills: 0 | Status: DISCONTINUED | OUTPATIENT
Start: 2022-04-02 | End: 2022-04-05

## 2022-04-02 RX ORDER — FUROSEMIDE 40 MG
40 TABLET ORAL ONCE
Refills: 0 | Status: COMPLETED | OUTPATIENT
Start: 2022-04-02 | End: 2022-04-02

## 2022-04-02 RX ORDER — FUROSEMIDE 40 MG
40 TABLET ORAL DAILY
Refills: 0 | Status: DISCONTINUED | OUTPATIENT
Start: 2022-04-03 | End: 2022-04-05

## 2022-04-02 RX ORDER — ASPIRIN/CALCIUM CARB/MAGNESIUM 324 MG
81 TABLET ORAL DAILY
Refills: 0 | Status: DISCONTINUED | OUTPATIENT
Start: 2022-04-02 | End: 2022-04-05

## 2022-04-02 RX ORDER — METOPROLOL TARTRATE 50 MG
50 TABLET ORAL DAILY
Refills: 0 | Status: DISCONTINUED | OUTPATIENT
Start: 2022-04-02 | End: 2022-04-05

## 2022-04-02 RX ORDER — ATORVASTATIN CALCIUM 80 MG/1
40 TABLET, FILM COATED ORAL AT BEDTIME
Refills: 0 | Status: DISCONTINUED | OUTPATIENT
Start: 2022-04-02 | End: 2022-04-05

## 2022-04-02 RX ORDER — ISOSORBIDE MONONITRATE 60 MG/1
30 TABLET, EXTENDED RELEASE ORAL DAILY
Refills: 0 | Status: DISCONTINUED | OUTPATIENT
Start: 2022-04-02 | End: 2022-04-05

## 2022-04-02 RX ORDER — AMLODIPINE BESYLATE 2.5 MG/1
5 TABLET ORAL DAILY
Refills: 0 | Status: DISCONTINUED | OUTPATIENT
Start: 2022-04-02 | End: 2022-04-05

## 2022-04-02 RX ADMIN — ATORVASTATIN CALCIUM 40 MILLIGRAM(S): 80 TABLET, FILM COATED ORAL at 22:19

## 2022-04-02 RX ADMIN — Medication 81 MILLIGRAM(S): at 19:06

## 2022-04-02 RX ADMIN — Medication 40 MILLIGRAM(S): at 15:01

## 2022-04-02 NOTE — H&P ADULT - HISTORY OF PRESENT ILLNESS
89 yo female PMH HTN, HLD, CHF, Afib on xarelto who presents to the ED with sob and decreased exercise tolerance. She can usally walk around the house, but over the past couple days, can only walk a few steps before is winded. Has some bilateral leg edema. Has been compliant with her lasix. Has had nonproductive cough, no fever, no chest pain. 89 yo female PMH HTN, HLD, CHF, Afib on Xarelto who presents to the ED with sob and decreased exercise tolerance. She can usally walk around the house, but over the past couple days, can only walk a few steps before is winded. Has some bilateral leg edema. Has been compliant with her lasix. Has had nonproductive cough, no fever, no chest pain.

## 2022-04-02 NOTE — ED ADULT NURSE NOTE - OBJECTIVE STATEMENT
89 year female accompanied by daughter hx of CHF, HTN, HLD, a.fib c/o SOB x1 day. Patient gained 2lbs since yesterday and has become sob on exertion. Patient denies any CP, dizziness or n/d/v.

## 2022-04-02 NOTE — H&P ADULT - ASSESSMENT
89 yo female PMH HTN, HLD, CHF, Afib on Xarelto who presents to the ED with sob and decreased exercise tolerance. She can usally walk around the house, but over the past couple days, can only walk a few steps before is winded. Has some bilateral leg edema. Has been compliant with her lasix. Has had nonproductive cough, no fever, no chest pain.    Plan: Admit to tele for mild CHF exacerbation. CXR prelim is clear with cardiomegally. TTE ordered, added IV Lasix 40 mg daily, follow trop in AM and   daily weight. UA negative, flu A,B and Covid negative. BNP elevated at 1,748.     Continue home meds of Norvasc, ASA, Lipitor, Imdur, Toprol, Protonix and Xarelto.  89 yo female PMH HTN, HLD, CHF, Afib on Xarelto who presents to the ED with sob and decreased exercise tolerance. She can usally walk around the house, but over the past couple days, can only walk a few steps before is winded. Has some bilateral leg edema. Has been compliant with her lasix. Has had nonproductive cough, no fever, no chest pain.    Plan: Admit to tele for mild CHF exacerbation. CXR prelim is clear with cardiomegally. TTE ordered, added IV Lasix 40 mg daily, follow trop in AM and   daily weight. UA negative, flu A,B and Covid negative. BNP elevated at 1,748.     Continue home meds of Norvasc, ASA, Lipitor, Imdur, Toprol, Protonix and Xarelto 15 mg/day (dose confirmed with pharmacy).    Called contact [facility] at [phone], no answer. Will need to confirm home meds in AM.

## 2022-04-02 NOTE — ED PROVIDER NOTE - OBJECTIVE STATEMENT
Pt is a 89 yo lady with a pmhx of HTN, HL, CHF, Afib on xarelto who presents to the ED with sob and decreased exercise tolerance. She can usally walk around the house, but over the past couple days, can only walk a few steps before is winded. Has some bilateral leg edema. Has been compliant with her lasix. Has had nonproductive cough, no fever, no chest pain.

## 2022-04-02 NOTE — H&P ADULT - NSHPLABSRESULTS_GEN_ALL_CORE
LABS:                        12.4   4.46  )-----------( 183      ( 2022 14:40 )             37.4     04-02    140  |  106  |  30<H>  ----------------------------<  111<H>  4.0   |  32<H>  |  1.36<H>    Ca    9.2      2022 14:40    TPro  7.3  /  Alb  3.6  /  TBili  0.8  /  DBili  x   /  AST  35  /  ALT  45  /  AlkPhos  100  04-02      Urinalysis Basic - ( 2022 14:40 )    Color: Yellow / Appearance: Clear / S.005 / pH: x  Gluc: x / Ketone: Negative  / Bili: Negative / Urobili: Negative mg/dL   Blood: x / Protein: Negative mg/dL / Nitrite: Negative   Leuk Esterase: Negative / RBC: x / WBC x   Sq Epi: x / Non Sq Epi: x / Bacteria: x          RADIOLOGY & ADDITIONAL TESTS:

## 2022-04-02 NOTE — ED PROVIDER NOTE - MUSCULOSKELETAL, MLM
Spine appears normal, range of motion is not limited, no muscle or joint tenderness. 1+ edema bilateral legs

## 2022-04-02 NOTE — ED PROVIDER NOTE - CLINICAL SUMMARY MEDICAL DECISION MAKING FREE TEXT BOX
Ddx: CHF exacerbation/ pulmonary edema/ ro acs  Plan: Cbc, cmp, bnp, cxr, troponin, ecg, likely admit

## 2022-04-02 NOTE — PATIENT PROFILE ADULT - FALL HARM RISK - HARM RISK INTERVENTIONS
Assistance with ambulation/Assistance OOB with selected safe patient handling equipment/Communicate Risk of Fall with Harm to all staff/Discuss with provider need for PT consult/Monitor gait and stability/Reinforce activity limits and safety measures with patient and family/Tailored Fall Risk Interventions/Use of alarms - bed, chair and/or voice tab/Visual Cue: Yellow wristband and red socks/Bed in lowest position, wheels locked, appropriate side rails in place/Call bell, personal items and telephone in reach/Instruct patient to call for assistance before getting out of bed or chair/Non-slip footwear when patient is out of bed/[place] to call system/Physically safe environment - no spills, clutter or unnecessary equipment/Purposeful Proactive Rounding/Room/bathroom lighting operational, light cord in reach

## 2022-04-02 NOTE — ED ADULT NURSE NOTE - PRO INTERPRETER NEED 2
Other Bactrim Counseling:  I discussed with the patient the risks of sulfa antibiotics including but not limited to GI upset, allergic reaction, drug rash, diarrhea, dizziness, photosensitivity, and yeast infections.  Rarely, more serious reactions can occur including but not limited to aplastic anemia, agranulocytosis, methemoglobinemia, blood dyscrasias, liver or kidney failure, lung infiltrates or desquamative/blistering drug rashes.

## 2022-04-03 LAB
ANION GAP SERPL CALC-SCNC: 5 MMOL/L — SIGNIFICANT CHANGE UP (ref 5–17)
BUN SERPL-MCNC: 28 MG/DL — HIGH (ref 7–23)
CALCIUM SERPL-MCNC: 9.3 MG/DL — SIGNIFICANT CHANGE UP (ref 8.5–10.1)
CHLORIDE SERPL-SCNC: 106 MMOL/L — SIGNIFICANT CHANGE UP (ref 96–108)
CO2 SERPL-SCNC: 30 MMOL/L — SIGNIFICANT CHANGE UP (ref 22–31)
CREAT SERPL-MCNC: 1.22 MG/DL — SIGNIFICANT CHANGE UP (ref 0.5–1.3)
CULTURE RESULTS: SIGNIFICANT CHANGE UP
EGFR: 42 ML/MIN/1.73M2 — LOW
GLUCOSE SERPL-MCNC: 79 MG/DL — SIGNIFICANT CHANGE UP (ref 70–99)
POTASSIUM SERPL-MCNC: 4 MMOL/L — SIGNIFICANT CHANGE UP (ref 3.5–5.3)
POTASSIUM SERPL-SCNC: 4 MMOL/L — SIGNIFICANT CHANGE UP (ref 3.5–5.3)
SODIUM SERPL-SCNC: 141 MMOL/L — SIGNIFICANT CHANGE UP (ref 135–145)
SPECIMEN SOURCE: SIGNIFICANT CHANGE UP
TROPONIN I, HIGH SENSITIVITY RESULT: 29.7 NG/L — SIGNIFICANT CHANGE UP

## 2022-04-03 PROCEDURE — 99233 SBSQ HOSP IP/OBS HIGH 50: CPT

## 2022-04-03 RX ORDER — ACETAMINOPHEN 500 MG
650 TABLET ORAL EVERY 6 HOURS
Refills: 0 | Status: DISCONTINUED | OUTPATIENT
Start: 2022-04-03 | End: 2022-04-05

## 2022-04-03 RX ADMIN — Medication 40 MILLIGRAM(S): at 11:45

## 2022-04-03 RX ADMIN — Medication 81 MILLIGRAM(S): at 11:45

## 2022-04-03 RX ADMIN — ATORVASTATIN CALCIUM 40 MILLIGRAM(S): 80 TABLET, FILM COATED ORAL at 23:06

## 2022-04-03 RX ADMIN — PANTOPRAZOLE SODIUM 40 MILLIGRAM(S): 20 TABLET, DELAYED RELEASE ORAL at 06:20

## 2022-04-03 RX ADMIN — Medication 650 MILLIGRAM(S): at 18:04

## 2022-04-03 RX ADMIN — RIVAROXABAN 15 MILLIGRAM(S): KIT at 11:46

## 2022-04-04 LAB
ANION GAP SERPL CALC-SCNC: 6 MMOL/L — SIGNIFICANT CHANGE UP (ref 5–17)
BUN SERPL-MCNC: 25 MG/DL — HIGH (ref 7–23)
CALCIUM SERPL-MCNC: 9 MG/DL — SIGNIFICANT CHANGE UP (ref 8.5–10.1)
CHLORIDE SERPL-SCNC: 106 MMOL/L — SIGNIFICANT CHANGE UP (ref 96–108)
CO2 SERPL-SCNC: 29 MMOL/L — SIGNIFICANT CHANGE UP (ref 22–31)
CREAT SERPL-MCNC: 1.17 MG/DL — SIGNIFICANT CHANGE UP (ref 0.5–1.3)
EGFR: 44 ML/MIN/1.73M2 — LOW
GLUCOSE SERPL-MCNC: 78 MG/DL — SIGNIFICANT CHANGE UP (ref 70–99)
POTASSIUM SERPL-MCNC: 3.3 MMOL/L — LOW (ref 3.5–5.3)
POTASSIUM SERPL-SCNC: 3.3 MMOL/L — LOW (ref 3.5–5.3)
SODIUM SERPL-SCNC: 141 MMOL/L — SIGNIFICANT CHANGE UP (ref 135–145)

## 2022-04-04 PROCEDURE — 99232 SBSQ HOSP IP/OBS MODERATE 35: CPT

## 2022-04-04 PROCEDURE — 99497 ADVNCD CARE PLAN 30 MIN: CPT

## 2022-04-04 RX ORDER — POTASSIUM CHLORIDE 20 MEQ
40 PACKET (EA) ORAL EVERY 4 HOURS
Refills: 0 | Status: COMPLETED | OUTPATIENT
Start: 2022-04-04 | End: 2022-04-04

## 2022-04-04 RX ADMIN — ISOSORBIDE MONONITRATE 30 MILLIGRAM(S): 60 TABLET, EXTENDED RELEASE ORAL at 11:15

## 2022-04-04 RX ADMIN — Medication 40 MILLIEQUIVALENT(S): at 15:02

## 2022-04-04 RX ADMIN — AMLODIPINE BESYLATE 5 MILLIGRAM(S): 2.5 TABLET ORAL at 06:12

## 2022-04-04 RX ADMIN — Medication 40 MILLIEQUIVALENT(S): at 12:52

## 2022-04-04 RX ADMIN — ATORVASTATIN CALCIUM 40 MILLIGRAM(S): 80 TABLET, FILM COATED ORAL at 21:38

## 2022-04-04 RX ADMIN — PANTOPRAZOLE SODIUM 40 MILLIGRAM(S): 20 TABLET, DELAYED RELEASE ORAL at 06:12

## 2022-04-04 RX ADMIN — Medication 50 MILLIGRAM(S): at 11:15

## 2022-04-04 RX ADMIN — Medication 40 MILLIGRAM(S): at 06:12

## 2022-04-04 RX ADMIN — Medication 81 MILLIGRAM(S): at 11:15

## 2022-04-04 RX ADMIN — RIVAROXABAN 15 MILLIGRAM(S): KIT at 11:15

## 2022-04-04 NOTE — PHYSICAL THERAPY INITIAL EVALUATION ADULT - DIAGNOSIS, PT EVAL
Decreased general endurance. Pt is independent in bed mobility, transfers and ambulation using cane (her baseline)

## 2022-04-04 NOTE — DIETITIAN INITIAL EVALUATION ADULT. - PERTINENT LABORATORY DATA
04-04 Na141 mmol/L Glu 78 mg/dL K+ 3.3 mmol/L<L> Cr  1.17 mg/dL BUN 25 mg/dL<H> WBC --    04-02 Alb 3.6 g/dL

## 2022-04-04 NOTE — PHYSICAL THERAPY INITIAL EVALUATION ADULT - LIVES WITH, PROFILE
Pt states she lives in pvt house with daughter, has 2 steps with rails to enter, no steps to use once inside the house.

## 2022-04-04 NOTE — PHYSICAL THERAPY INITIAL EVALUATION ADULT - PHYSICAL ASSIST/NONPHYSICAL ASSIST: STAND/SIT, REHAB EVAL
TAMSULOSIN HCL 0.4 MG CAPSULE  Last Written Prescription Date:  4/5/2021  Last Fill Quantity: 60,   # refills: 0  Last Office Visit : 2/18/2020  Future Office visit:  5/11/2021  30 days sent to pharm 4/30/2021      Cris Almeida RN  Central Triage Red Flags/Med Refills     set-up required

## 2022-04-04 NOTE — PHYSICAL THERAPY INITIAL EVALUATION ADULT - GAIT TRAINING, PT EVAL
Improve ambulation tolerance to 200 feet or more and eventually return to be a good community ambulator.

## 2022-04-04 NOTE — PROGRESS NOTE ADULT - ASSESSMENT
89 yo female PMH HTN, HLD, CHF, Afib on Xarelto who presents to the ED with sob and decreased exercise tolerance. She can usally walk around the house, but over the past couple days, can only walk a few steps before is winded. Has some bilateral leg edema. Has been compliant with her lasix. Has had nonproductive cough, no fever, no chest pain.    Acute in chronic CHF:  - On NC  - Continue IV Lasix  - Follow up 2D echo  - Salt and fluid restriction, daily wt  - Trop neg    HTN:  - BP acceptable  - Continue current meds    Paroxysmal  afib:  - HR controlled on BB  - On Xarelto    HLD:  - Continue sttain    Contact [facility] at [phone]
89 yo female PMH HTN, HLD, CHF, Afib on Xarelto who presents to the ED with sob and decreased exercise tolerance. She can usally walk around the house, but over the past couple days, can only walk a few steps before is winded. Has some bilateral leg edema. Has been compliant with her lasix. Has had nonproductive cough, no fever, no chest pain.    Acute in chronic CHF:  - Continue IV Lasix, cam switch to PO in am   - Follow up 2D echo  - Salt and fluid restriction, daily wt  - Trop neg  - SO2 ok in RA    HTN:  - BP acceptable  - Continue current meds    Paroxysmal  afib:  - HR controlled on BB  - On Xarelto    HLD:  - Continue sttain    Contact [facility]iter at [phone]

## 2022-04-04 NOTE — GOALS OF CARE CONVERSATION - ADVANCED CARE PLANNING - CONVERSATION DETAILS
Attempted to speak with patient in native language of [place] via Language Line  Curtis ID # [id number] but pt was unable to hear her well enough to communicate. I called pt's daughter Ariadna who has been caretaker of her mother. We spoke of her mother's overall condition and hearing difficulty as well as her chronic heart issues. I inquired if she had ever had discussions with her mother regarding advanced care plans or if her mother had a living will or anything of the sort. Ariadna informed me they had not but that she expected her mother to be given all of the necessary medical attention in the event of emergency or cardiac arrest. I encouraged her to speak with her mother about these things and plan for the future.

## 2022-04-04 NOTE — DIETITIAN INITIAL EVALUATION ADULT. - PERTINENT MEDS FT
MEDICATIONS  (STANDING):  amLODIPine   Tablet 5 milliGRAM(s) Oral daily  aspirin enteric coated 81 milliGRAM(s) Oral daily  atorvastatin 40 milliGRAM(s) Oral at bedtime  furosemide   Injectable 40 milliGRAM(s) IV Push daily  isosorbide   mononitrate ER Tablet (IMDUR) 30 milliGRAM(s) Oral daily  metoprolol succinate ER 50 milliGRAM(s) Oral daily  pantoprazole    Tablet 40 milliGRAM(s) Oral before breakfast  potassium chloride   Powder 40 milliEquivalent(s) Oral every 4 hours  rivaroxaban 15 milliGRAM(s) Oral daily    MEDICATIONS  (PRN):  acetaminophen     Tablet .. 650 milliGRAM(s) Oral every 6 hours PRN Temp greater or equal to 38C (100.4F), Moderate Pain (4 - 6)

## 2022-04-04 NOTE — PROGRESS NOTE ADULT - SUBJECTIVE AND OBJECTIVE BOX
Patient is a 89y old  Female who presents with a chief complaint of SOB from mild CHF exacerbation. (2022 12:54)    INTERVAL HPI/OVERNIGHT EVENTS:  Pt was seen and examined, no acute events.    MEDICATIONS  (STANDING):  amLODIPine   Tablet 5 milliGRAM(s) Oral daily  aspirin enteric coated 81 milliGRAM(s) Oral daily  atorvastatin 40 milliGRAM(s) Oral at bedtime  furosemide   Injectable 40 milliGRAM(s) IV Push daily  isosorbide   mononitrate ER Tablet (IMDUR) 30 milliGRAM(s) Oral daily  metoprolol succinate ER 50 milliGRAM(s) Oral daily  pantoprazole    Tablet 40 milliGRAM(s) Oral before breakfast  potassium chloride   Powder 40 milliEquivalent(s) Oral every 4 hours  rivaroxaban 15 milliGRAM(s) Oral daily    MEDICATIONS  (PRN):  acetaminophen     Tablet .. 650 milliGRAM(s) Oral every 6 hours PRN Temp greater or equal to 38C (100.4F), Moderate Pain (4 - 6)      Allergies  No Known Allergies      Vital Signs Last 24 Hrs  T(C): 36.8 (2022 11:25), Max: 36.8 (2022 11:25)  T(F): 98.2 (2022 11:25), Max: 98.2 (2022 11:25)  HR: 63 (2022 11:25) (56 - 69)  BP: 127/72 (2022 11:25) (114/70 - 129/75)  BP(mean): --  RR: 18 (2022 11:25) (18 - 18)  SpO2: 98% (2022 11:25) (97% - 100%)    PHYSICAL EXAM:  GENERAL: NAD  HEAD:  Atraumatic  EYES: PERRLA  NERVOUS SYSTEM:  Awake, alert, non focal  CHEST/LUNG: Clear  HEART: RRR, S1, S2  ABDOMEN: Soft, non tender  EXTREMITIES:  no edema    LABS:                        12.4   4.46  )-----------( 183      ( 2022 14:40 )             37.4     04-04    141  |  106  |  25<H>  ----------------------------<  78  3.3<L>   |  29  |  1.17    Ca    9.0      2022 07:14    TPro  7.3  /  Alb  3.6  /  TBili  0.8  /  DBili  x   /  AST  35  /  ALT  45  /  AlkPhos  100  04-      Urinalysis Basic - ( 2022 14:40 )    Color: Yellow / Appearance: Clear / S.005 / pH: x  Gluc: x / Ketone: Negative  / Bili: Negative / Urobili: Negative mg/dL   Blood: x / Protein: Negative mg/dL / Nitrite: Negative   Leuk Esterase: Negative / RBC: x / WBC x   Sq Epi: x / Non Sq Epi: x / Bacteria: x      CAPILLARY BLOOD GLUCOSE          Culture - Urine (collected 2022 18:25)  Source: Clean Catch Clean Catch (Midstream)  Final Report (2022 16:36):    <10,000 CFU/mL Normal Urogenital Ciarra      RADIOLOGY & ADDITIONAL TESTS:    Imaging Personally Reviewed:  [ ] YES  [ ] NO    Consultant(s) Notes Reviewed:  [ ] YES  [ ] NO    Care Discussed with Consultants/Other Providers [ ] YES  [ ] NO
Patient is a 89y old  Female who presents with a chief complaint of SOB from mild CHF exacerbation. (2022 17:25)    INTERVAL HPI/OVERNIGHT EVENTS:  Pt was seen and examined, no acute events.    MEDICATIONS  (STANDING):  amLODIPine   Tablet 5 milliGRAM(s) Oral daily  aspirin enteric coated 81 milliGRAM(s) Oral daily  atorvastatin 40 milliGRAM(s) Oral at bedtime  furosemide   Injectable 40 milliGRAM(s) IV Push daily  isosorbide   mononitrate ER Tablet (IMDUR) 30 milliGRAM(s) Oral daily  metoprolol succinate ER 50 milliGRAM(s) Oral daily  pantoprazole    Tablet 40 milliGRAM(s) Oral before breakfast  rivaroxaban 15 milliGRAM(s) Oral daily    MEDICATIONS  (PRN):      Allergies  No Known Allergies      Vital Signs Last 24 Hrs  T(C): 36.6 (2022 10:52), Max: 36.7 (2022 16:58)  T(F): 97.8 (2022 10:52), Max: 98 (2022 16:58)  HR: 75 (2022 10:52) (60 - 77)  BP: 103/66 (2022 10:52) (103/66 - 128/80)  BP(mean): 83 (2022 16:58) (83 - 83)  RR: 18 (2022 10:52) (15 - 18)  SpO2: 95% (2022 10:52) (95% - 99%)      PHYSICAL EXAM:  GENERAL: NAD  HEAD:  Atraumatic  EYES: PERRLA  NERVOUS SYSTEM:  Awake, alert, non focal  CHEST/LUNG: Clear  HEART: RRR, S1, S2  ABDOMEN: Soft, non tender  EXTREMITIES:  no edema      LABS:                        12.4   4.46  )-----------( 183      ( 2022 14:40 )             37.4     04-03    141  |  106  |  28<H>  ----------------------------<  79  4.0   |  30  |  1.22    Ca    9.3      2022 07:45    TPro  7.3  /  Alb  3.6  /  TBili  0.8  /  DBili  x   /  AST  35  /  ALT  45  /  AlkPhos  100  04-02      Urinalysis Basic - ( 2022 14:40 )    Color: Yellow / Appearance: Clear / S.005 / pH: x  Gluc: x / Ketone: Negative  / Bili: Negative / Urobili: Negative mg/dL   Blood: x / Protein: Negative mg/dL / Nitrite: Negative   Leuk Esterase: Negative / RBC: x / WBC x   Sq Epi: x / Non Sq Epi: x / Bacteria: x      CAPILLARY BLOOD GLUCOSE          Culture - Urine (collected 2022 18:25)  Source: Clean Catch Clean Catch (Midstream)  Final Report (2022 16:36):    <10,000 CFU/mL Normal Urogenital Ciarra      RADIOLOGY & ADDITIONAL TESTS:    Imaging Personally Reviewed:  [ ] YES  [ ] NO    Consultant(s) Notes Reviewed:  [ ] YES  [ ] NO    Care Discussed with Consultants/Other Providers [ ] YES  [ ] NO

## 2022-04-04 NOTE — PHYSICAL THERAPY INITIAL EVALUATION ADULT - GENERAL OBSERVATIONS, REHAB EVAL
Pt is alert, oriented x 4, coherent, on room air, not in distress, denies pain, speaks Igbo language, staff that speak her language fluently interpreted ([place] can't connect to this language)

## 2022-04-04 NOTE — DIETITIAN INITIAL EVALUATION ADULT. - OTHER INFO
Pt sleeping during time of visit; spoke to pt's daughter Ariadna via telephone at [phone] who provided information regarding pt's diet & wt hx. Pt lives with daughter who does food shopping/cooking; pt with good appetite/po intake PTA; follows low sodium diet at home & daughter monitors pt's fluid intake (however pt does not always follow recommended fluid intake), & daughter monitors for fluid retention/edema in legs.  Per PCA, pt with good appetite, po intake >75% during admission; no reports of chewing/swallowing difficulty.  Daughter reported pt's weight fluctuates, typically 180-190 lbs.; current wt within typical wt range.

## 2022-04-05 ENCOUNTER — TRANSCRIPTION ENCOUNTER (OUTPATIENT)
Age: 87
End: 2022-04-05

## 2022-04-05 VITALS
HEART RATE: 58 BPM | DIASTOLIC BLOOD PRESSURE: 71 MMHG | TEMPERATURE: 98 F | OXYGEN SATURATION: 96 % | SYSTOLIC BLOOD PRESSURE: 120 MMHG | RESPIRATION RATE: 18 BRPM

## 2022-04-05 LAB
ANION GAP SERPL CALC-SCNC: 6 MMOL/L — SIGNIFICANT CHANGE UP (ref 5–17)
BUN SERPL-MCNC: 36 MG/DL — HIGH (ref 7–23)
CALCIUM SERPL-MCNC: 9.1 MG/DL — SIGNIFICANT CHANGE UP (ref 8.5–10.1)
CHLORIDE SERPL-SCNC: 106 MMOL/L — SIGNIFICANT CHANGE UP (ref 96–108)
CO2 SERPL-SCNC: 29 MMOL/L — SIGNIFICANT CHANGE UP (ref 22–31)
CREAT SERPL-MCNC: 1.33 MG/DL — HIGH (ref 0.5–1.3)
EGFR: 38 ML/MIN/1.73M2 — LOW
GLUCOSE SERPL-MCNC: 90 MG/DL — SIGNIFICANT CHANGE UP (ref 70–99)
POTASSIUM SERPL-MCNC: 4.2 MMOL/L — SIGNIFICANT CHANGE UP (ref 3.5–5.3)
POTASSIUM SERPL-SCNC: 4.2 MMOL/L — SIGNIFICANT CHANGE UP (ref 3.5–5.3)
SODIUM SERPL-SCNC: 141 MMOL/L — SIGNIFICANT CHANGE UP (ref 135–145)

## 2022-04-05 PROCEDURE — 99239 HOSP IP/OBS DSCHRG MGMT >30: CPT

## 2022-04-05 PROCEDURE — 93306 TTE W/DOPPLER COMPLETE: CPT | Mod: 26

## 2022-04-05 RX ORDER — METOCLOPRAMIDE HCL 10 MG
1 TABLET ORAL
Qty: 0 | Refills: 0 | DISCHARGE

## 2022-04-05 RX ORDER — RIVAROXABAN 15 MG-20MG
1 KIT ORAL
Qty: 0 | Refills: 0 | DISCHARGE
Start: 2022-04-05

## 2022-04-05 RX ORDER — DABIGATRAN ETEXILATE MESYLATE 150 MG/1
1 CAPSULE ORAL
Qty: 0 | Refills: 0 | DISCHARGE

## 2022-04-05 RX ORDER — FUROSEMIDE 40 MG
1 TABLET ORAL
Qty: 30 | Refills: 0
Start: 2022-04-05 | End: 2022-05-04

## 2022-04-05 RX ADMIN — PANTOPRAZOLE SODIUM 40 MILLIGRAM(S): 20 TABLET, DELAYED RELEASE ORAL at 06:03

## 2022-04-05 RX ADMIN — RIVAROXABAN 15 MILLIGRAM(S): KIT at 11:09

## 2022-04-05 RX ADMIN — AMLODIPINE BESYLATE 5 MILLIGRAM(S): 2.5 TABLET ORAL at 06:03

## 2022-04-05 RX ADMIN — Medication 81 MILLIGRAM(S): at 11:09

## 2022-04-05 RX ADMIN — Medication 40 MILLIGRAM(S): at 06:05

## 2022-04-05 RX ADMIN — Medication 50 MILLIGRAM(S): at 06:07

## 2022-04-05 NOTE — DISCHARGE NOTE PROVIDER - NSDCMRMEDTOKEN_GEN_ALL_CORE_FT
amLODIPine 5 mg oral tablet: 1 tab(s) orally once a day  aspirin 81 mg oral delayed release tablet: 1 tab(s) orally once a day  atorvastatin 40 mg oral tablet: 1 tab(s) orally once a day  furosemide 40 mg oral tablet: 1 tab(s) orally once a day   isosorbide mononitrate 30 mg oral tablet, extended release: 1 tab(s) orally once a day (in the morning)  Metoprolol Succinate ER 50 mg oral tablet, extended release: 1 tab(s) orally once a day  pantoprazole 40 mg oral delayed release tablet: 1 tab(s) orally once a day (before a meal)  rivaroxaban 15 mg oral tablet: 1 tab(s) orally once a day  theophylline 400 mg/24 hours oral tablet, extended release: 1 tab(s) orally once a day

## 2022-04-05 NOTE — DISCHARGE NOTE NURSING/CASE MANAGEMENT/SOCIAL WORK - NSDCPEFALRISK_GEN_ALL_CORE
For information on Fall & Injury Prevention, visit: [email or web address] OR  [email or web address] OR  https://www.cdc.gov/steadi/patient.html

## 2022-04-05 NOTE — DISCHARGE NOTE NURSING/CASE MANAGEMENT/SOCIAL WORK - PATIENT PORTAL LINK FT
You can access the FollowMyHealth Patient Portal offered by [facility] by registering at the following website: [email or web address]. By joining [employer]’s FollowMyHealth portal, you will also be able to view your health information using other applications (apps) compatible with our system.

## 2022-04-05 NOTE — DISCHARGE NOTE PROVIDER - HOSPITAL COURSE
89 yo female PMH HTN, HLD, CHF, Afib on Xarelto who presents to the ED with sob and decreased exercise tolerance. She can usally walk around the house, but over the past couple days, can only walk a few steps before is winded. Has some bilateral leg edema. Has been compliant with her Lasix. Has had nonproductive cough, no fever, no chest pain.    Acute in chronic Diastolic CHF:  - S/P IV Lasix, increase to 40 mg daily ( was on every other day)  - 2D echo:   1. Left ventricular ejection fraction, by visual estimation, is 55 to   60%.   2. Normal global left ventricular systolic function.   3. Severely enlarged left atrium.   4. Spectral Doppler shows restrictive pattern of left ventricular   myocardial filling (Grade III diastolic dysfunction).   5. There is mild concentric left ventricular hypertrophy.   6. Right atrial enlargement.   7. Mild mitral valve regurgitation.   8. Moderate-severe tricuspid regurgitation.   9. Moderate aortic regurgitation.  10. Calcified trileaflet aortic valve with mildly reduced systolic   opening.  11. Estimated pulmonary artery systolic pressure is 41.6 mmHg assuming a   right atrial pressure of 15 mmHg, which is consistent with mild pulmonary   hypertension.  12. Compared to the study dated 9/12/2021, no significant changes noted.  - Salt and fluid restriction  - Trop neg  - SO2 ok in RA  - Pt will follow up    HTN:  - BP acceptable  - Continue current meds    Paroxysmal  afib:  - HR controlled on BB  - On Xarelto    HLD:  - Continue statin.    Contact Piter at [phone]

## 2022-04-05 NOTE — DISCHARGE NOTE PROVIDER - NSDCCPCAREPLAN_GEN_ALL_CORE_FT
PRINCIPAL DISCHARGE DIAGNOSIS  Diagnosis: CHF exacerbation  Assessment and Plan of Treatment: Acute in chronic Diastolic CHF:  - S/P IV Lasix, increase home dose to 40 mg daily ( was on every other day)  - 2D echo:   1. Left ventricular ejection fraction, by visual estimation, is 55 to   60%.   2. Normal global left ventricular systolic function.   3. Severely enlarged left atrium.   4. Spectral Doppler shows restrictive pattern of left ventricular   myocardial filling (Grade III diastolic dysfunction).   5. There is mild concentric left ventricular hypertrophy.   6. Right atrial enlargement.   7. Mild mitral valve regurgitation.   8. Moderate-severe tricuspid regurgitation.   9. Moderate aortic regurgitation.  10. Calcified trileaflet aortic valve with mildly reduced systolic   opening.  11. Estimated pulmonary artery systolic pressure is 41.6 mmHg assuming a   right atrial pressure of 15 mmHg, which is consistent with mild pulmonary   hypertension.  12. Compared to the study dated 9/12/2021, no significant changes noted.  - Salt and fluid restriction  - Trop neg  - SO2 ok in RA  - Pt will follow up  HTN:  - BP acceptable  - Continue current meds  Paroxysmal  afib:  - HR controlled on BB  - On Xarelto  HLD:  - Continue statin.  Contact Piter at [phone]

## 2022-04-07 ENCOUNTER — NON-APPOINTMENT (OUTPATIENT)
Age: 87
End: 2022-04-07

## 2022-04-10 DIAGNOSIS — Z79.01 LONG TERM (CURRENT) USE OF ANTICOAGULANTS: ICD-10-CM

## 2022-04-10 DIAGNOSIS — E78.00 PURE HYPERCHOLESTEROLEMIA, UNSPECIFIED: ICD-10-CM

## 2022-04-10 DIAGNOSIS — I50.33 ACUTE ON CHRONIC DIASTOLIC (CONGESTIVE) HEART FAILURE: ICD-10-CM

## 2022-04-10 DIAGNOSIS — Z96.653 PRESENCE OF ARTIFICIAL KNEE JOINT, BILATERAL: ICD-10-CM

## 2022-04-10 DIAGNOSIS — I48.0 PAROXYSMAL ATRIAL FIBRILLATION: ICD-10-CM

## 2022-04-10 DIAGNOSIS — I27.20 PULMONARY HYPERTENSION, UNSPECIFIED: ICD-10-CM

## 2022-04-10 DIAGNOSIS — I08.3 COMBINED RHEUMATIC DISORDERS OF MITRAL, AORTIC AND TRICUSPID VALVES: ICD-10-CM

## 2022-04-10 DIAGNOSIS — I11.0 HYPERTENSIVE HEART DISEASE WITH HEART FAILURE: ICD-10-CM

## 2022-04-10 DIAGNOSIS — Z96.611 PRESENCE OF RIGHT ARTIFICIAL SHOULDER JOINT: ICD-10-CM

## 2022-04-10 DIAGNOSIS — I50.9 HEART FAILURE, UNSPECIFIED: ICD-10-CM

## 2022-04-12 ENCOUNTER — APPOINTMENT (OUTPATIENT)
Dept: CARE COORDINATION | Facility: HOME HEALTH | Age: 87
End: 2022-04-12
Payer: MEDICARE

## 2022-04-12 VITALS
DIASTOLIC BLOOD PRESSURE: 72 MMHG | HEART RATE: 61 BPM | SYSTOLIC BLOOD PRESSURE: 136 MMHG | OXYGEN SATURATION: 97 % | RESPIRATION RATE: 17 BRPM | TEMPERATURE: 97.7 F

## 2022-04-12 DIAGNOSIS — I48.0 PAROXYSMAL ATRIAL FIBRILLATION: ICD-10-CM

## 2022-04-12 DIAGNOSIS — I50.32 CHRONIC DIASTOLIC (CONGESTIVE) HEART FAILURE: ICD-10-CM

## 2022-04-12 DIAGNOSIS — I10 ESSENTIAL (PRIMARY) HYPERTENSION: ICD-10-CM

## 2022-04-12 DIAGNOSIS — Z86.39 PERSONAL HISTORY OF OTHER ENDOCRINE, NUTRITIONAL AND METABOLIC DISEASE: ICD-10-CM

## 2022-04-12 PROCEDURE — 99348 HOME/RES VST EST LOW MDM 30: CPT

## 2022-04-12 RX ORDER — METOPROLOL SUCCINATE 50 MG/1
50 TABLET, EXTENDED RELEASE ORAL DAILY
Refills: 0 | Status: DISCONTINUED | COMMUNITY
End: 2022-04-12

## 2022-04-12 RX ORDER — SPIRONOLACTONE 25 MG/1
25 TABLET ORAL DAILY
Refills: 0 | Status: ACTIVE | COMMUNITY

## 2022-04-12 RX ORDER — FAMOTIDINE 40 MG/1
40 TABLET, FILM COATED ORAL
Refills: 0 | Status: ACTIVE | COMMUNITY

## 2022-04-12 RX ORDER — PANTOPRAZOLE 40 MG/1
40 TABLET, DELAYED RELEASE ORAL
Refills: 0 | Status: ACTIVE | COMMUNITY

## 2022-04-12 RX ORDER — RIVAROXABAN 15 MG/1
15 TABLET, FILM COATED ORAL DAILY
Qty: 30 | Refills: 0 | Status: ACTIVE | COMMUNITY
Start: 2022-04-12 | End: 1900-01-01

## 2022-04-12 RX ORDER — TRAMADOL HYDROCHLORIDE 50 MG/1
50 TABLET, COATED ORAL EVERY 6 HOURS
Refills: 0 | Status: DISCONTINUED | COMMUNITY
End: 2022-04-12

## 2022-04-12 RX ORDER — FUROSEMIDE 40 MG/1
40 TABLET ORAL DAILY
Refills: 0 | Status: ACTIVE | COMMUNITY

## 2022-04-12 RX ORDER — METOCLOPRAMIDE 5 MG/1
5 TABLET ORAL
Refills: 0 | Status: DISCONTINUED | COMMUNITY
End: 2022-04-12

## 2022-04-12 RX ORDER — FAMOTIDINE 40 MG/1
40 TABLET, FILM COATED ORAL
Refills: 0 | Status: DISCONTINUED | COMMUNITY
End: 2022-04-12

## 2022-04-12 NOTE — PLAN
[FreeTextEntry1] : -daughter to f/u with Dr. Duarte's office for follow up appointment; declined NP assistance with making f/u appointment.\par \par -Pating/family was informed about NP’s role/ STARS program and overview of transitional care reviewed with patient. Patient/family educated on topics of importance such as compliance with all provider visits, prescribed medication regimen, and low salt / heart healthy diet. Patient/Family encouraged calling NP with any issues, concerns or questions, also educated to notify NP if experiencing CP, SOB , cough, increased mucus/phlegm production, abdominal discomfort/swelling, difficulty sleeping or lying flat, fever, chills, fatigue, weight gain of 2-3lbs in 24 hours or 5lbs in one week, dizziness, lightheadedness, n/v/d/c, swelling to extremities and/or any c/o or concerns. Reassurance provided.

## 2022-04-12 NOTE — ASSESSMENT
[FreeTextEntry1] : #HFpEF\par ????LVEF 55 - 60%  ?\par currently on MRA, Diuretic and Nitrate.\par not on BB at this time (hx of bradycardia as per daughter)\par  HTN, afib optimization of medical management. Weight edema check.\par F/u w/ cardiology to discuss recent hospitalization and treatment plan. \par ??\par \par  # HTN\par Controlled.\par Continue current medications. No change in management.\par Discussed DASH diet and dietary sodium restrictions.\par Continue/Increase dietary efforts and physical activity.\par \par \par # Atrial fibrillation\par Stable.\par Rate controlled.\par Continue anticoagulation.\par No changes in management.\par Maintain safety precautions\par \par \par

## 2022-04-12 NOTE — HEALTH RISK ASSESSMENT
[Never] : Never [No] : No [No falls in past year] : Patient reported no falls in the past year [Patient refused screening] : Patient refused screening [Low Salt Diet] : low salt [Poor Adherence] : poor adherence [With Family] : lives with family [FreeTextEntry4] : Patient remains a Full Code

## 2022-04-12 NOTE — PHYSICAL EXAM
[No Acute Distress] : no acute distress [Normal Voice/Communication] : normal voice/communication [Normal Sclera/Conjunctiva] : normal sclera/conjunctiva [PERRL] : pupils equal round and reactive to light [EOMI] : extraocular movements intact [Normal Outer Ear/Nose] : the outer ears and nose were normal in appearance [No JVD] : no jugular venous distention [Supple] : supple [No Respiratory Distress] : no respiratory distress  [Clear to Auscultation] : lungs were clear to auscultation bilaterally [No Accessory Muscle Use] : no accessory muscle use [Normal Rate] : normal rate  [Regular Rhythm] : with a regular rhythm [Normal S1, S2] : normal S1 and S2 [Pedal Pulses Present] : the pedal pulses are present [No Edema] : there was no peripheral edema [No Extremity Clubbing/Cyanosis] : no extremity clubbing/cyanosis [Soft] : abdomen soft [Non Tender] : non-tender [Normal Bowel Sounds] : normal bowel sounds [No CVA Tenderness] : no CVA  tenderness [No Joint Swelling] : no joint swelling [No Rash] : no rash [Normal Gait] : normal gait [Coordination Grossly Intact] : coordination grossly intact [No Focal Deficits] : no focal deficits [Normal Affect] : the affect was normal [Alert and Oriented x3] : oriented to person, place, and time

## 2022-04-12 NOTE — HISTORY OF PRESENT ILLNESS
[Post-hospitalization from ___ Hospital] : Post-hospitalization from [unfilled] Hospital [Admitted on: ___] : The patient was admitted on [unfilled] [Discharged on ___] : discharged on [unfilled] [Discharge Summary] : discharge summary [Pertinent Labs] : pertinent labs [Radiology Findings] : radiology findings [Discharge Med List] : discharge medication list [Med Reconciliation] : medication reconciliation has been completed [Patient Contacted By: ____] : and contacted by [unfilled] [FreeTextEntry3] : TCM [facility] Hospitalization Follow up: Heart Failure [FreeTextEntry2] : Ms. Styles is a 89 y/o Igbo speaking woman with HTN, HLD, PAF on Xarelto and HFpEF with recent admission for acute on chronic heart failure. \par \par Ms. Styles seen in the home today. Daughter, Ariadna and granddaughter present during home visit. Observed patient ambulating independently with no distress. Since discharge, she reports feeling well. She is able to ambulate within the home with minimal rest. They did not perform daily weight today but state weight on 4/10 and 4/ lbs on home scale. Non-adherent with diet- lengthy discussion with family and patient regarding Na and the effects on patient's overall health condition.  Denies orthopnea/PND, increased edema, SOB and/or Chest pain. Medication discrepancy- patient taking rivaroxaban 20mg daily instead of rivaroxaban 15mg. (GFR- 38; script sent to pharmacy). \par Daughter in agreement to speak with Dr. Duarte's office to closer hospital follow up. \par COVID vaccine status- Pfizer/Booster.\par

## 2022-04-22 ENCOUNTER — NON-APPOINTMENT (OUTPATIENT)
Age: 87
End: 2022-04-22

## 2022-04-26 ENCOUNTER — TRANSCRIPTION ENCOUNTER (OUTPATIENT)
Age: 87
End: 2022-04-26

## 2022-05-17 ENCOUNTER — RX RENEWAL (OUTPATIENT)
Age: 87
End: 2022-05-17

## 2022-09-24 ENCOUNTER — APPOINTMENT (OUTPATIENT)
Dept: MRI IMAGING | Facility: CLINIC | Age: 87
End: 2022-09-24

## 2022-09-24 ENCOUNTER — OUTPATIENT (OUTPATIENT)
Dept: OUTPATIENT SERVICES | Facility: HOSPITAL | Age: 87
LOS: 1 days | End: 2022-09-24
Payer: MEDICARE

## 2022-09-24 DIAGNOSIS — Z96.611 PRESENCE OF RIGHT ARTIFICIAL SHOULDER JOINT: Chronic | ICD-10-CM

## 2022-09-24 DIAGNOSIS — Z96.653 PRESENCE OF ARTIFICIAL KNEE JOINT, BILATERAL: Chronic | ICD-10-CM

## 2022-09-24 DIAGNOSIS — Z00.8 ENCOUNTER FOR OTHER GENERAL EXAMINATION: ICD-10-CM

## 2022-09-24 PROCEDURE — 72148 MRI LUMBAR SPINE W/O DYE: CPT | Mod: MH

## 2022-09-24 PROCEDURE — 72148 MRI LUMBAR SPINE W/O DYE: CPT | Mod: 26,MH

## 2023-01-12 ENCOUNTER — INPATIENT (INPATIENT)
Facility: HOSPITAL | Age: 88
LOS: 2 days | Discharge: HOME HEALTH | End: 2023-01-15
Attending: INTERNAL MEDICINE | Admitting: INTERNAL MEDICINE
Payer: MEDICARE

## 2023-01-12 VITALS
HEART RATE: 90 BPM | DIASTOLIC BLOOD PRESSURE: 67 MMHG | WEIGHT: 180.78 LBS | HEIGHT: 65 IN | SYSTOLIC BLOOD PRESSURE: 120 MMHG | TEMPERATURE: 101 F | OXYGEN SATURATION: 96 % | RESPIRATION RATE: 16 BRPM

## 2023-01-12 DIAGNOSIS — Z96.611 PRESENCE OF RIGHT ARTIFICIAL SHOULDER JOINT: Chronic | ICD-10-CM

## 2023-01-12 DIAGNOSIS — Z96.653 PRESENCE OF ARTIFICIAL KNEE JOINT, BILATERAL: Chronic | ICD-10-CM

## 2023-01-12 LAB
ALBUMIN SERPL ELPH-MCNC: 3.6 G/DL — SIGNIFICANT CHANGE UP (ref 3.3–5)
ALP SERPL-CCNC: 90 U/L — SIGNIFICANT CHANGE UP (ref 40–120)
ALT FLD-CCNC: 33 U/L — SIGNIFICANT CHANGE UP (ref 12–78)
ANION GAP SERPL CALC-SCNC: 8 MMOL/L — SIGNIFICANT CHANGE UP (ref 5–17)
ANISOCYTOSIS BLD QL: SLIGHT — SIGNIFICANT CHANGE UP
AST SERPL-CCNC: 38 U/L — HIGH (ref 15–37)
BASOPHILS # BLD AUTO: 0 K/UL — SIGNIFICANT CHANGE UP (ref 0–0.2)
BASOPHILS NFR BLD AUTO: 0 % — SIGNIFICANT CHANGE UP (ref 0–2)
BILIRUB SERPL-MCNC: 0.8 MG/DL — SIGNIFICANT CHANGE UP (ref 0.2–1.2)
BUN SERPL-MCNC: 31 MG/DL — HIGH (ref 7–23)
CALCIUM SERPL-MCNC: 8.9 MG/DL — SIGNIFICANT CHANGE UP (ref 8.5–10.1)
CHLORIDE SERPL-SCNC: 101 MMOL/L — SIGNIFICANT CHANGE UP (ref 96–108)
CO2 SERPL-SCNC: 30 MMOL/L — SIGNIFICANT CHANGE UP (ref 22–31)
CREAT SERPL-MCNC: 1.44 MG/DL — HIGH (ref 0.5–1.3)
EGFR: 34 ML/MIN/1.73M2 — LOW
EOSINOPHIL # BLD AUTO: 0.05 K/UL — SIGNIFICANT CHANGE UP (ref 0–0.5)
EOSINOPHIL NFR BLD AUTO: 1 % — SIGNIFICANT CHANGE UP (ref 0–6)
FLUAV AG NPH QL: SIGNIFICANT CHANGE UP
FLUBV AG NPH QL: SIGNIFICANT CHANGE UP
GLUCOSE SERPL-MCNC: 77 MG/DL — SIGNIFICANT CHANGE UP (ref 70–99)
HCT VFR BLD CALC: 43.6 % — SIGNIFICANT CHANGE UP (ref 34.5–45)
HGB BLD-MCNC: 14.2 G/DL — SIGNIFICANT CHANGE UP (ref 11.5–15.5)
LYMPHOCYTES # BLD AUTO: 1.55 K/UL — SIGNIFICANT CHANGE UP (ref 1–3.3)
LYMPHOCYTES # BLD AUTO: 30 % — SIGNIFICANT CHANGE UP (ref 13–44)
MACROCYTES BLD QL: SLIGHT — SIGNIFICANT CHANGE UP
MANUAL SMEAR VERIFICATION: SIGNIFICANT CHANGE UP
MCHC RBC-ENTMCNC: 32.6 G/DL — SIGNIFICANT CHANGE UP (ref 32–36)
MCHC RBC-ENTMCNC: 33.3 PG — SIGNIFICANT CHANGE UP (ref 27–34)
MCV RBC AUTO: 102.3 FL — HIGH (ref 80–100)
MONOCYTES # BLD AUTO: 1.03 K/UL — HIGH (ref 0–0.9)
MONOCYTES NFR BLD AUTO: 20 % — HIGH (ref 2–14)
NEUTROPHILS # BLD AUTO: 2.48 K/UL — SIGNIFICANT CHANGE UP (ref 1.8–7.4)
NEUTROPHILS NFR BLD AUTO: 48 % — SIGNIFICANT CHANGE UP (ref 43–77)
NRBC # BLD: 0 /100 — SIGNIFICANT CHANGE UP (ref 0–0)
NRBC # BLD: SIGNIFICANT CHANGE UP /100 WBCS (ref 0–0)
NT-PROBNP SERPL-SCNC: 2930 PG/ML — HIGH (ref 0–450)
PLAT MORPH BLD: NORMAL — SIGNIFICANT CHANGE UP
PLATELET # BLD AUTO: 193 K/UL — SIGNIFICANT CHANGE UP (ref 150–400)
POTASSIUM SERPL-MCNC: 4.6 MMOL/L — SIGNIFICANT CHANGE UP (ref 3.5–5.3)
POTASSIUM SERPL-SCNC: 4.6 MMOL/L — SIGNIFICANT CHANGE UP (ref 3.5–5.3)
PROT SERPL-MCNC: 7.6 GM/DL — SIGNIFICANT CHANGE UP (ref 6–8.3)
RBC # BLD: 4.26 M/UL — SIGNIFICANT CHANGE UP (ref 3.8–5.2)
RBC # FLD: 13.2 % — SIGNIFICANT CHANGE UP (ref 10.3–14.5)
RBC BLD AUTO: ABNORMAL
SARS-COV-2 RNA SPEC QL NAA+PROBE: DETECTED
SCHISTOCYTES BLD QL AUTO: SLIGHT — SIGNIFICANT CHANGE UP
SODIUM SERPL-SCNC: 139 MMOL/L — SIGNIFICANT CHANGE UP (ref 135–145)
TROPONIN I, HIGH SENSITIVITY RESULT: 65.5 NG/L — HIGH
VARIANT LYMPHS # BLD: 1 % — SIGNIFICANT CHANGE UP (ref 0–6)
WBC # BLD: 5.16 K/UL — SIGNIFICANT CHANGE UP (ref 3.8–10.5)
WBC # FLD AUTO: 5.16 K/UL — SIGNIFICANT CHANGE UP (ref 3.8–10.5)

## 2023-01-12 PROCEDURE — 99285 EMERGENCY DEPT VISIT HI MDM: CPT | Mod: CS

## 2023-01-12 PROCEDURE — 71045 X-RAY EXAM CHEST 1 VIEW: CPT | Mod: 26

## 2023-01-12 PROCEDURE — 93010 ELECTROCARDIOGRAM REPORT: CPT

## 2023-01-12 PROCEDURE — 99223 1ST HOSP IP/OBS HIGH 75: CPT

## 2023-01-12 RX ORDER — SODIUM CHLORIDE 9 MG/ML
500 INJECTION INTRAMUSCULAR; INTRAVENOUS; SUBCUTANEOUS ONCE
Refills: 0 | Status: COMPLETED | OUTPATIENT
Start: 2023-01-12 | End: 2023-01-12

## 2023-01-12 RX ORDER — ACETAMINOPHEN 500 MG
650 TABLET ORAL ONCE
Refills: 0 | Status: COMPLETED | OUTPATIENT
Start: 2023-01-12 | End: 2023-01-12

## 2023-01-12 RX ADMIN — Medication 650 MILLIGRAM(S): at 20:00

## 2023-01-12 RX ADMIN — SODIUM CHLORIDE 500 MILLILITER(S): 9 INJECTION INTRAMUSCULAR; INTRAVENOUS; SUBCUTANEOUS at 19:53

## 2023-01-12 NOTE — ED PROVIDER NOTE - PHYSICAL EXAMINATION
Gen: Alert, NAD, well appearing  Head: NC, AT, EOMI, normal lids/conjunctiva  ENT: normal hearing, patent oropharynx without erythema/exudate, uvula midline  Neck: +supple, no tenderness/meningismus/JVD, +Trachea midline  Pulm: Bilateral BS, normal resp effort, no wheeze/stridor/retractions  CV: RRR, no M/R/G, +dist pulses  Abd: soft, NT/ND, Negative [place] signs, +BS, no palpable masses  Mskel: no edema/erythema/cyanosis  Skin: no rash, warm/dry  Neuro: AAOx3, no apparent sensory/motor deficits, coordination intact

## 2023-01-12 NOTE — ED PROVIDER NOTE - OBJECTIVE STATEMENT
Pertinent PMH/PSH/FHx/SHx and Review of Systems contained within:  Patient presents to the ED for flu like sx.  Patient with daughter at bedside, well appearing, states since yesterday chest congestion, cough, runny nose, body aches, and had episode of labored breathing at home.  No calf pain, leg swelling, vomiting, diarrhea.  Patient mentating at baseline, alert and oriented x3, eating and drinking at home.  On xarelto for afib, no fall reported by family.  Patient exposed to other sick family member at home.  Patient denies any sob unless under exertion.    Relevant PMHx/SHx/SOCHx/FAMH:  HTN, HLD, atrial fib, CHF, GERD  Patient denies EtOH/tobacco/illicit substance use.    ROS: No headache/photophobia/eye pain/changes in vision, No ear pain/sore throat/dysphagia, No chest pain/palpitations, no SOB/wheeze/stridor, No abdominal pain, No N/V/D/melena, no dysuria/frequency/discharge, No neck/back pain, no rash, no changes in neurological status/function.

## 2023-01-12 NOTE — ED ADULT TRIAGE NOTE - CHIEF COMPLAINT QUOTE
coughing, body aches, chest pain and "breathing is off" since yesterday after choking on her food. coughing ,weakness,  body aches, chest pain and "breathing is off" since yesterday after choking on her food.

## 2023-01-12 NOTE — ED ADULT NURSE NOTE - INTERPRETER NAME
Detail Level: Detailed
Medical Necessity Clause: This procedure was medically necessary because the lesions that were treated were:
X Size Of Lesion In Cm (Optional): 0
Total Volume Injected (Ccs- Only Use Numbers And Decimals): 2.0
Concentration Of Solution Injected (Mg/Ml): 10.0
Kenalog Preparation: Kenalog
Consent: The risks of atrophy were reviewed with the patient.
Include Z78.9 (Other Specified Conditions Influencing Health Status) As An Associated Diagnosis?: No
Ariadna Hare

## 2023-01-12 NOTE — ED ADULT NURSE NOTE - OBJECTIVE STATEMENT
Patient complaining of flu like symptoms, daughter at bedside states yesterday her mother had a choking episode and not feeling well. Patient sitting up at bedside complains of pain to chest, coughing and runny nose. Patient placed on cardiac monitor, in no acute respiratory distress at this time.

## 2023-01-12 NOTE — ED ADULT NURSE REASSESSMENT NOTE - NS ED NURSE REASSESS COMMENT FT1
1900 Pt received lying comfortable in bed breathing room air in no acute distress. Alert and responsive to all stimuli. All safety maintained. Pending further orders. TAN Strong RN

## 2023-01-12 NOTE — ED ADULT NURSE NOTE - NSIMPLEMENTINTERV_GEN_ALL_ED
Implemented All Fall Risk Interventions:  [place] to call system. Call bell, personal items and telephone within reach. Instruct patient to call for assistance. Room bathroom lighting operational. Non-slip footwear when patient is off stretcher. Physically safe environment: no spills, clutter or unnecessary equipment. Stretcher in lowest position, wheels locked, appropriate side rails in place. Provide visual cue, wrist band, yellow gown, etc. Monitor gait and stability. Monitor for mental status changes and reorient to person, place, and time. Review medications for side effects contributing to fall risk. Reinforce activity limits and safety measures with patient and family.

## 2023-01-12 NOTE — ED PROVIDER NOTE - CLINICAL SUMMARY MEDICAL DECISION MAKING FREE TEXT BOX
Elderly female with COVID, chest pain, trop elevated, EKG baseline, suspect heart strain.  VSS, no dyspnea or oxygen requirements, labs with mild CLAU.  No suspected PE.  Patient is to be admitted to the hospital and the case was discussed with the admitting physician.  Any changes in plan, additional imaging/labs, and further work up will be at the discretion of the admitting physician. Per discussion with admitting physician, all necessary consults for admitted patients to be obtained by morning team unless patient requires emergent intervention or medical advice by specialist overnight.

## 2023-01-12 NOTE — ED ADULT NURSE NOTE - CHIEF COMPLAINT QUOTE
coughing ,weakness,  body aches, chest pain and "breathing is off" since yesterday after choking on her food.

## 2023-01-13 DIAGNOSIS — I50.9 HEART FAILURE, UNSPECIFIED: ICD-10-CM

## 2023-01-13 DIAGNOSIS — U07.1 COVID-19: ICD-10-CM

## 2023-01-13 DIAGNOSIS — I48.91 UNSPECIFIED ATRIAL FIBRILLATION: ICD-10-CM

## 2023-01-13 DIAGNOSIS — I10 ESSENTIAL (PRIMARY) HYPERTENSION: ICD-10-CM

## 2023-01-13 DIAGNOSIS — N17.9 ACUTE KIDNEY FAILURE, UNSPECIFIED: ICD-10-CM

## 2023-01-13 DIAGNOSIS — E78.5 HYPERLIPIDEMIA, UNSPECIFIED: ICD-10-CM

## 2023-01-13 LAB
APPEARANCE UR: CLEAR — SIGNIFICANT CHANGE UP
BACTERIA # UR AUTO: ABNORMAL
BILIRUB UR-MCNC: NEGATIVE — SIGNIFICANT CHANGE UP
COLOR SPEC: YELLOW — SIGNIFICANT CHANGE UP
DIFF PNL FLD: ABNORMAL
EPI CELLS # UR: SIGNIFICANT CHANGE UP
GLUCOSE UR QL: 1000 MG/DL
KETONES UR-MCNC: NEGATIVE — SIGNIFICANT CHANGE UP
LEUKOCYTE ESTERASE UR-ACNC: ABNORMAL
NITRITE UR-MCNC: NEGATIVE — SIGNIFICANT CHANGE UP
PH UR: 6 — SIGNIFICANT CHANGE UP (ref 5–8)
PROT UR-MCNC: 30 MG/DL
RBC CASTS # UR COMP ASSIST: SIGNIFICANT CHANGE UP /HPF (ref 0–4)
SP GR SPEC: 1.02 — SIGNIFICANT CHANGE UP (ref 1.01–1.02)
TROPONIN I, HIGH SENSITIVITY RESULT: 69.9 NG/L — HIGH
UROBILINOGEN FLD QL: NEGATIVE MG/DL — SIGNIFICANT CHANGE UP
WBC UR QL: SIGNIFICANT CHANGE UP

## 2023-01-13 PROCEDURE — 99233 SBSQ HOSP IP/OBS HIGH 50: CPT

## 2023-01-13 RX ORDER — EMPAGLIFLOZIN 10 MG/1
0 TABLET, FILM COATED ORAL
Qty: 0 | Refills: 0 | DISCHARGE

## 2023-01-13 RX ORDER — ATORVASTATIN CALCIUM 80 MG/1
40 TABLET, FILM COATED ORAL AT BEDTIME
Refills: 0 | Status: DISCONTINUED | OUTPATIENT
Start: 2023-01-13 | End: 2023-01-15

## 2023-01-13 RX ORDER — AMLODIPINE BESYLATE 2.5 MG/1
1 TABLET ORAL
Qty: 0 | Refills: 0 | DISCHARGE

## 2023-01-13 RX ORDER — LANOLIN ALCOHOL/MO/W.PET/CERES
3 CREAM (GRAM) TOPICAL AT BEDTIME
Refills: 0 | Status: DISCONTINUED | OUTPATIENT
Start: 2023-01-13 | End: 2023-01-15

## 2023-01-13 RX ORDER — METOPROLOL TARTRATE 50 MG
1 TABLET ORAL
Qty: 0 | Refills: 0 | DISCHARGE

## 2023-01-13 RX ORDER — ISOSORBIDE MONONITRATE 60 MG/1
1 TABLET, EXTENDED RELEASE ORAL
Qty: 0 | Refills: 0 | DISCHARGE

## 2023-01-13 RX ORDER — ATORVASTATIN CALCIUM 80 MG/1
1 TABLET, FILM COATED ORAL
Qty: 0 | Refills: 0 | DISCHARGE

## 2023-01-13 RX ORDER — EMPAGLIFLOZIN 10 MG/1
1 TABLET, FILM COATED ORAL
Qty: 0 | Refills: 0 | DISCHARGE

## 2023-01-13 RX ORDER — THEOPHYLLINE ANHYDROUS 200 MG
1 TABLET, EXTENDED RELEASE 12 HR ORAL
Qty: 0 | Refills: 0 | DISCHARGE

## 2023-01-13 RX ORDER — ACETAMINOPHEN 500 MG
650 TABLET ORAL EVERY 6 HOURS
Refills: 0 | Status: DISCONTINUED | OUTPATIENT
Start: 2023-01-13 | End: 2023-01-15

## 2023-01-13 RX ORDER — ASPIRIN/CALCIUM CARB/MAGNESIUM 324 MG
81 TABLET ORAL DAILY
Refills: 0 | Status: DISCONTINUED | OUTPATIENT
Start: 2023-01-13 | End: 2023-01-15

## 2023-01-13 RX ORDER — ASPIRIN/CALCIUM CARB/MAGNESIUM 324 MG
1 TABLET ORAL
Qty: 30 | Refills: 0

## 2023-01-13 RX ORDER — ONDANSETRON 8 MG/1
4 TABLET, FILM COATED ORAL EVERY 8 HOURS
Refills: 0 | Status: DISCONTINUED | OUTPATIENT
Start: 2023-01-13 | End: 2023-01-15

## 2023-01-13 RX ORDER — SPIRONOLACTONE 25 MG/1
25 TABLET, FILM COATED ORAL DAILY
Refills: 0 | Status: DISCONTINUED | OUTPATIENT
Start: 2023-01-13 | End: 2023-01-15

## 2023-01-13 RX ORDER — ACETAMINOPHEN 500 MG
325 TABLET ORAL ONCE
Refills: 0 | Status: COMPLETED | OUTPATIENT
Start: 2023-01-13 | End: 2023-01-13

## 2023-01-13 RX ORDER — SPIRONOLACTONE 25 MG/1
1 TABLET, FILM COATED ORAL
Qty: 0 | Refills: 1 | DISCHARGE

## 2023-01-13 RX ORDER — RIVAROXABAN 15 MG-20MG
15 KIT ORAL
Refills: 0 | Status: DISCONTINUED | OUTPATIENT
Start: 2023-01-13 | End: 2023-01-15

## 2023-01-13 RX ORDER — SPIRONOLACTONE 25 MG/1
0 TABLET, FILM COATED ORAL
Qty: 0 | Refills: 1 | DISCHARGE

## 2023-01-13 RX ORDER — FUROSEMIDE 40 MG
40 TABLET ORAL DAILY
Refills: 0 | Status: DISCONTINUED | OUTPATIENT
Start: 2023-01-13 | End: 2023-01-15

## 2023-01-13 RX ADMIN — Medication 650 MILLIGRAM(S): at 01:19

## 2023-01-13 RX ADMIN — Medication 81 MILLIGRAM(S): at 11:59

## 2023-01-13 RX ADMIN — Medication 325 MILLIGRAM(S): at 13:07

## 2023-01-13 RX ADMIN — Medication 650 MILLIGRAM(S): at 13:35

## 2023-01-13 RX ADMIN — Medication 40 MILLIGRAM(S): at 06:21

## 2023-01-13 RX ADMIN — RIVAROXABAN 15 MILLIGRAM(S): KIT at 17:20

## 2023-01-13 RX ADMIN — SPIRONOLACTONE 25 MILLIGRAM(S): 25 TABLET, FILM COATED ORAL at 06:21

## 2023-01-13 RX ADMIN — ATORVASTATIN CALCIUM 40 MILLIGRAM(S): 80 TABLET, FILM COATED ORAL at 22:01

## 2023-01-13 RX ADMIN — Medication 325 MILLIGRAM(S): at 13:35

## 2023-01-13 RX ADMIN — Medication 650 MILLIGRAM(S): at 13:07

## 2023-01-13 NOTE — H&P ADULT - PROBLEM SELECTOR PLAN 4
pt denies/doesnt remember being on any HTN meds. though chart review notes during last visit  pt was on  Amlodipine 5  Imdur ER 30  Metoprolol 50  Will hold HTN meds based on pts BP being on the softer side. Verify actual medication list in AM or restart previous meds if needed.

## 2023-01-13 NOTE — PROGRESS NOTE ADULT - SUBJECTIVE AND OBJECTIVE BOX
CHIEF COMPLAINT: Follow up of COVID +   + weakness   cough +  no fever   no sob at rest  on chest pain  no diarrhea   no leg pains or swelling       PHYSICAL EXAM:    GENERAL: Obese, no acute distress   CHEST/LUNG: Clear to ausculation bilaterally, no wheezing, no crackles   HEART: Regular rate and rhythm;   ABDOMEN: Soft, Nontender, Nondistended; Bowel sounds present  EXTREMITIES:  Moving all four extremities spontaneously, No clubbing, cyanosis, or edema  NERVOUS SYSTEM:  Grossly non focal.  Psychiatry: AAO x 2-3, mood is appropriate       OBJECTIVE DATA:   Vital Signs Last 24 Hrs  T(C): 37.5 (2023 06:26), Max: 38.1 (2023 15:51)  T(F): 99.5 (2023 06:26), Max: 100.6 (2023 15:51)  HR: 86 (2023 08:38) (81 - 90)  BP: 137/72 (2023 08:38) (118/71 - 137/72)  BP(mean): --  RR: 18 (2023 08:38) (16 - 22)  SpO2: 95% (2023 08:38) (95% - 99%)    Parameters below as of 2023 08:38  Patient On (Oxygen Delivery Method): room air               Daily Height in cm: 165.1 (2023 15:51)    Daily   LABS:                        14.2   5.16  )-----------( 193      ( 2023 19:10 )             43.6             -12    139  |  101  |  31<H>  ----------------------------<  77  4.6   |  30  |  1.44<H>    Ca    8.9      2023 19:10    TPro  7.6  /  Alb  3.6  /  TBili  0.8  /  DBili  x   /  AST  38<H>  /  ALT  33  /  AlkPhos  90  01-12                Urinalysis Basic - ( 2023 01:00 )    Color: Yellow / Appearance: Clear / S.020 / pH: x  Gluc: x / Ketone: Negative  / Bili: Negative / Urobili: Negative mg/dL   Blood: x / Protein: 30 mg/dL / Nitrite: Negative   Leuk Esterase: Trace / RBC: 0-2 /HPF / WBC 0-2   Sq Epi: x / Non Sq Epi: Few / Bacteria: Moderate          Interval Radiology studies: reviewed by me    < from: Xray Chest 1 View- PORTABLE-Urgent (Xray Chest 1 View- PORTABLE-Urgent .) (23 @ 19:49) >    Impression:    No acute pulmonary disease.    < end of copied text >      Tele reviewed by me showed no significant arrhythmias     MEDICATIONS  (STANDING):  aspirin enteric coated 81 milliGRAM(s) Oral daily  atorvastatin 40 milliGRAM(s) Oral at bedtime  furosemide    Tablet 40 milliGRAM(s) Oral daily  rivaroxaban 15 milliGRAM(s) Oral with dinner  spironolactone 25 milliGRAM(s) Oral daily    MEDICATIONS  (PRN):  acetaminophen     Tablet .. 650 milliGRAM(s) Oral every 6 hours PRN Temp greater or equal to 38C (100.4F), Mild Pain (1 - 3)  aluminum hydroxide/magnesium hydroxide/simethicone Suspension 30 milliLiter(s) Oral every 4 hours PRN Dyspepsia  melatonin 3 milliGRAM(s) Oral at bedtime PRN Insomnia  ondansetron Injectable 4 milliGRAM(s) IV Push every 8 hours PRN Nausea and/or Vomiting

## 2023-01-13 NOTE — H&P ADULT - ASSESSMENT
The patient is a 89y Female PMH HTN, HLD, atrial fib, CHF, GERD presents to the ED for flu like sx since yesterday chest congestion, cough, runny nose, body aches, and had episode of labored breathing at home. Workup + Covid

## 2023-01-13 NOTE — H&P ADULT - NSHPLABSRESULTS_GEN_ALL_CORE
=======================================================  Labs:                        14.2   5.16  )-----------( 193      ( 12 Jan 2023 19:10 )             43.6     01-12    139  |  101  |  31<H>  ----------------------------<  77  4.6   |  30  |  1.44<H>    Ca    8.9      12 Jan 2023 19:10    TPro  7.6  /  Alb  3.6  /  TBili  0.8  /  DBili  x   /  AST  38<H>  /  ALT  33  /  AlkPhos  90  01-12      Creatinine, Serum: 1.44 mg/dL (01-12-23 @ 19:10)            WBC Count: 5.16 K/uL (01-12-23 @ 19:10)    SARS-CoV-2 Result: Detected (01-12-23 @ 19:10)      Alkaline Phosphatase, Serum: 90 U/L (01-12-23 @ 19:10)  Alanine Aminotransferase (ALT/SGPT): 33 U/L (01-12-23 @ 19:10)  Aspartate Aminotransferase (AST/SGOT): 38 U/L (01-12-23 @ 19:10)  Bilirubin Total, Serum: 0.8 mg/dL (01-12-23 @ 19:10)

## 2023-01-13 NOTE — PATIENT PROFILE ADULT - FALL HARM RISK - HARM RISK INTERVENTIONS

## 2023-01-13 NOTE — H&P ADULT - HISTORY OF PRESENT ILLNESS
The patient is a 89y Female PMH HTN, HLD, atrial fib, CHF, GERD presents to the ED for flu like sx.  Patient with daughter at bedside, well appearing, states since yesterday chest congestion, cough, runny nose, body aches, and had episode of labored breathing at home.  No calf pain, leg swelling, vomiting, diarrhea.  Patient mentating at baseline, alert and oriented x3, eating and drinking at home.  On xarelto for afib, no fall reported by family.  Patient exposed to other sick family member at home.  Patient denies any sob unless under exertion.

## 2023-01-13 NOTE — PROGRESS NOTE ADULT - ASSESSMENT
The patient is a 89y Female PMH HTN, HLD, atrial fib, CHF, GERD presents to the ED for flu like sx since yesterday chest congestion, cough, runny nose, body aches, and had episode of labored breathing at home. Workup + Covid     Problem/Plan - 1:  ·  Problem: 2019 novel coronavirus disease (COVID-19).   ·  Plan: - cont isolation and supportive care. cont xarelto. no active gross bleeding     Problem/Plan - 2:  ·  Problem: CLAU (acute kidney injury).   ·  Plan: cont hydration. recheck BMP in am.     Problem/Plan - 3:  ·  Problem: paroxysmal Atrial fibrillation.   ·  Plan: Xarelto. rate controlled.     Problem/Plan - 4:  ·  Problem: HTN (hypertension).   ·  Plan: controlled. cont current management. Monitor.     Problem/Plan - 5:  ·  Problem: Chronic CHF. euvolemic.   ·  Plan: Lasix  [employer] non formulary.    Problem/Plan - 6:  ·  Problem: HLD (hyperlipidemia).   ·  Plan: lipitor.    PT eval  full code  Xarelto.

## 2023-01-13 NOTE — H&P ADULT - NSHPREVIEWOFSYSTEMS_GEN_ALL_CORE
REVIEW OF SYSTEMS:    CONSTITUTIONAL: No weakness, +fevers or chills  EYES/ENT: No visual changes;  No vertigo or throat pain , + Runny nose  NECK: No pain or stiffness  RESPIRATORY: +cough, wheezing, hemoptysis; +shortness of breath  CARDIOVASCULAR: No chest pain or palpitations  GASTROINTESTINAL: No abdominal or epigastric pain. No nausea, vomiting, or hematemesis; No diarrhea or constipation. No melena or hematochezia.  GENITOURINARY: No dysuria, frequency or hematuria  NEUROLOGICAL: No numbness or weakness  SKIN: No itching, rashes

## 2023-01-13 NOTE — ED ADULT NURSE REASSESSMENT NOTE - NS ED NURSE REASSESS COMMENT FT1
Pt in stable condition, admitted to tele and awaiting bed assignment. Covid positive. Hand off give to CHRISTIANO Fitzgerald. Safety maintained. BISMARK Strong. RN

## 2023-01-13 NOTE — H&P ADULT - PROBLEM SELECTOR PLAN 1
- Covid + which can explain all pt symptoms  - No supplemental o2 required no indication for decadron + remdesivir  - Likely candidate for paxlovid.  - pt already on Xarelto so no significant benefit of checking DDimer.

## 2023-01-13 NOTE — PATIENT PROFILE ADULT - MEDICATIONS/VISITS
[facility]  [facility] [place]    Subjective   Patient ID: Alethea is a 9 year old female.    Chief Complaint   Patient presents with   • Injury     cut with sicciors     HPI  History obtained from mother   Reviewed outside chart records including note from PCP [facility] on 6/3/21    History reviewed. No pertinent past medical history.    Pt is a 9 y.o F with no sig pmh who presents with concerns of injury. Pt cut finger at school while cutting cardboard. Started bleeding. Pressure placed. Still some bleeding but overall has stopped. No family hx of bleeding/clotting disorders.     Vaccinations verified dtap June 2017     MEDICATIONS:  No current outpatient medications on file.     No current facility-administered medications for this visit.       ALLERGIES:  ALLERGIES:  No Known Allergies    PAST SURGICAL HISTORY:  History reviewed. No pertinent surgical history.    FAMILY HISTORY:  History reviewed. No pertinent family history.    SOCIAL HISTORY:  Social History     Tobacco Use   • Smoking status: Not on file   • Smokeless tobacco: Not on file   Substance Use Topics   • Alcohol use: Not on file   • Drug use: Not on file         Patient's medications, allergies, past medical, surgical, and social history  were reviewed and updated as appropriate.    Review of Systems  A 12 point review of systems was performed. Pertinent positives and negatives are noted in the HPI.     Objective   Visit Vitals  /73 (BP Location: LUE - Left upper extremity, Patient Position: Sitting, Cuff Size: Small Adult)   Pulse 86   Temp 98.6 °F (37 °C) (Temporal)   Resp 18   Ht 4' 9.09\" (1.45 m)   Wt 37.5 kg (82 lb 12.5 oz)   SpO2 98%   BMI 17.86 kg/m²     Physical Exam  GEN: NAD, sitting on exam table  CV: cap refill <3 seconds, radial pulse intact   SKIN: laceration noted to pad of the index finger on the left side  NEURO: proprioception and light touch intact   Lymph: no LAD present    Lacerations  Repair    Date/Time: 4/7/2022 4:31 PM  Performed by: Marianela Mosley MD  Authorized by: Marianela Mosley MD   Body area: upper extremity  Location details: left index finger  Laceration length: 1 cm  Foreign bodies: no foreign bodies  Tendon involvement: none  Nerve involvement: none  Vascular damage: no    Anesthesia:  Local Anesthetic: lidocaine 1% without epinephrine  Anesthetic total: 1.5 mL    Sedation:  Patient sedated: no    Preparation: Patient was prepped and draped in the usual sterile fashion.  Irrigation solution: saline  Irrigation method: syringe  Amount of cleaning: standard  Debridement: none  Degree of undermining: none  Skin closure: Ethilon (5-0)  Number of sutures: 4  Technique: simple  Approximation: close  Approximation difficulty: simple  Dressing: antibiotic ointment and gauze roll  Patient tolerance: patient tolerated the procedure well with no immediate complications               Assessment     This is a 9 year old year-old female who presents with finger laceration. No bleeding d/o, hemostatic, tetanus utd within past 5 years     1. Laceration of left index finger without foreign body with damage to nail, initial encounter        Orders Placed This Encounter   • Lacerations Repair     - Laceration repaired as described above.  - Steri strips applied after repair.  - Wound care reviewed with parents including skin sensitivities and signs of infection   - Follow up with pmd to remove sutures in 7-10 days  - Reviewed signs/symptoms to return for.  - Discharge home.         Instructions provided as documented in the AVS. Parent verbalizes understanding       no

## 2023-01-13 NOTE — H&P ADULT - NSHPADDITIONALINFOADULT_GEN_ALL_CORE
+ Trop but no CP at this time. EKG non ischemic  trend trops till downtrends. Likely demand ischemia.

## 2023-01-13 NOTE — H&P ADULT - NSHPPHYSICALEXAM_GEN_ALL_CORE
VITALS:   T(C): 37.5 (01-13-23 @ 06:26), Max: 38.1 (01-12-23 @ 15:51)  HR: 82 (01-13-23 @ 06:26) (81 - 90)  BP: 121/71 (01-13-23 @ 06:26) (118/71 - 127/65)  RR: 19 (01-13-23 @ 06:26) (16 - 22)  SpO2: 99% (01-13-23 @ 06:26) (96% - 99%)    GENERAL: NAD, lying in bed comfortably  HEAD:  Atraumatic, Normocephalic  EYES: EOMI, PERRLA, conjunctiva and sclera clear  ENT: Moist mucous membranes  NECK: Supple, No JVD  CHEST/LUNG: Clear to auscultation bilaterally;  Unlabored respirations  HEART: Irregular rate and rhythm; No murmurs, rubs, or gallops  ABDOMEN: BSx4; Soft, nontender, nondistended  EXTREMITIES:  2+ Peripheral Pulses, brisk capillary refill. No clubbing, cyanosis, or edema  NERVOUS SYSTEM:  A&Ox3, no focal deficits   SKIN: No rashes or lesions

## 2023-01-14 PROCEDURE — 99232 SBSQ HOSP IP/OBS MODERATE 35: CPT

## 2023-01-14 RX ADMIN — Medication 40 MILLIGRAM(S): at 06:06

## 2023-01-14 RX ADMIN — Medication 100 MILLIGRAM(S): at 21:46

## 2023-01-14 RX ADMIN — SPIRONOLACTONE 25 MILLIGRAM(S): 25 TABLET, FILM COATED ORAL at 06:06

## 2023-01-14 RX ADMIN — ATORVASTATIN CALCIUM 40 MILLIGRAM(S): 80 TABLET, FILM COATED ORAL at 21:46

## 2023-01-14 RX ADMIN — RIVAROXABAN 15 MILLIGRAM(S): KIT at 17:37

## 2023-01-14 RX ADMIN — Medication 81 MILLIGRAM(S): at 11:29

## 2023-01-14 NOTE — PHYSICAL THERAPY INITIAL EVALUATION ADULT - ADDITIONAL COMMENTS
As per dtr Ariadna, pt lives with her dtr in a house with no steps to enter. no steps at all. She was independent in all functional mobility using no AD, PTA, uses cane for outdoors with sup,

## 2023-01-14 NOTE — PHYSICAL THERAPY INITIAL EVALUATION ADULT - LEVEL OF INDEPENDENCE: SIT/STAND, REHAB EVAL
minimum assist (75% patients effort)

## 2023-01-14 NOTE — PHYSICAL THERAPY INITIAL EVALUATION ADULT - BALANCE TRAINING, PT EVAL
Pt will improve static & dynamic standing balance to Good using a cane to perform ADL, Gait independently in 3 weeks

## 2023-01-14 NOTE — PROGRESS NOTE ADULT - ASSESSMENT
The patient is a 89y Female PMH HTN, HLD, atrial fib, CHF, GERD presents to the ED for flu like sx since yesterday chest congestion, cough, runny nose, body aches, and had episode of labored breathing at home. Workup + Covid     Problem/Plan - 1:  ·  Problem: 2019 novel coronavirus disease (COVID-19).   ·  Plan: - cont isolation and supportive care. cont xarelto. no active gross bleeding     Problem/Plan - 2:  ·  Problem: CLAU (acute kidney injury).   ·  Plan: S/p hydration. Pending labs.     Problem/Plan - 3:  ·  Problem: paroxysmal Atrial fibrillation.   ·  Plan: Xarelto. rate controlled.     Problem/Plan - 4:  ·  Problem: HTN (hypertension).   ·  Plan: controlled. cont current management. Monitor.     Problem/Plan - 5:  ·  Problem: Chronic CHF. euvolemic.   ·  Plan: Lasix  Jardiance non formulary.    Problem/Plan - 6:  ·  Problem: HLD (hyperlipidemia).   ·  Plan: lipitor.    PT eval pending.   full code  Xarelto.

## 2023-01-14 NOTE — PROGRESS NOTE ADULT - SUBJECTIVE AND OBJECTIVE BOX
CHIEF COMPLAINT: Follow up of COVID +   + feeling weakness   cough + and episodic sob   no fever   on chest pain  no diarrhea   no leg pains or swelling       PHYSICAL EXAM:    GENERAL: Obese, no acute distress   CHEST/LUNG: Clear to ausculation bilaterally, no wheezing, no crackles   HEART: Regular rate and rhythm;   ABDOMEN: Soft, Nontender, Nondistended; Bowel sounds present  EXTREMITIES:  Moving all four extremities spontaneously, No clubbing, cyanosis, or edema  NERVOUS SYSTEM:  Grossly non focal.  Psychiatry: AAO x 2-3, mood is appropriate       OBJECTIVE DATA:        Vital Signs Last 24 Hrs  T(C): 36.2 (2023 11:15), Max: 36.9 (2023 23:34)  T(F): 97.1 (2023 11:15), Max: 98.5 (2023 23:34)  HR: 87 (2023 11:15) (82 - 90)  BP: 100/62 (2023 11:15) (100/62 - 128/82)  BP(mean): --  RR: 18 (2023 11:15) (18 - 19)  SpO2: 94% (2023 11:15) (94% - 96%)    Parameters below as of 2023 11:15  Patient On (Oxygen Delivery Method): room air               Daily     Daily   LABS:                        14.2   5.16  )-----------( 193      ( 2023 19:10 )             43.6             -12    139  |  101  |  31<H>  ----------------------------<  77  4.6   |  30  |  1.44<H>    Ca    8.9      2023 19:10    TPro  7.6  /  Alb  3.6  /  TBili  0.8  /  DBili  x   /  AST  38<H>  /  ALT  33  /  AlkPhos  90  -                Urinalysis Basic - ( 2023 01:00 )    Color: Yellow / Appearance: Clear / S.020 / pH: x  Gluc: x / Ketone: Negative  / Bili: Negative / Urobili: Negative mg/dL   Blood: x / Protein: 30 mg/dL / Nitrite: Negative   Leuk Esterase: Trace / RBC: 0-2 /HPF / WBC 0-2   Sq Epi: x / Non Sq Epi: Few / Bacteria: Moderate    MEDICATIONS  (STANDING):  aspirin enteric coated 81 milliGRAM(s) Oral daily  atorvastatin 40 milliGRAM(s) Oral at bedtime  furosemide    Tablet 40 milliGRAM(s) Oral daily  rivaroxaban 15 milliGRAM(s) Oral with dinner  spironolactone 25 milliGRAM(s) Oral daily    MEDICATIONS  (PRN):  acetaminophen     Tablet .. 650 milliGRAM(s) Oral every 6 hours PRN Temp greater or equal to 38C (100.4F), Mild Pain (1 - 3)  aluminum hydroxide/magnesium hydroxide/simethicone Suspension 30 milliLiter(s) Oral every 4 hours PRN Dyspepsia  melatonin 3 milliGRAM(s) Oral at bedtime PRN Insomnia  ondansetron Injectable 4 milliGRAM(s) IV Push every 8 hours PRN Nausea and/or Vomiting

## 2023-01-14 NOTE — PHYSICAL THERAPY INITIAL EVALUATION ADULT - TRANSFER TRAINING, PT EVAL
Pt will be able to perform sit to stand, stand pivot transfer using a cane independently in 2 to 3 weeks

## 2023-01-14 NOTE — PHYSICAL THERAPY INITIAL EVALUATION ADULT - STRENGTHENING, PT EVAL
no
Pt will improve muscle strength in all extremities to WFL in 1 to 2 weeks to perform Gait & ADL independently

## 2023-01-15 ENCOUNTER — TRANSCRIPTION ENCOUNTER (OUTPATIENT)
Age: 88
End: 2023-01-15

## 2023-01-15 VITALS
OXYGEN SATURATION: 98 % | SYSTOLIC BLOOD PRESSURE: 122 MMHG | HEART RATE: 60 BPM | DIASTOLIC BLOOD PRESSURE: 84 MMHG | RESPIRATION RATE: 19 BRPM | TEMPERATURE: 98 F

## 2023-01-15 LAB
CULTURE RESULTS: SIGNIFICANT CHANGE UP
SPECIMEN SOURCE: SIGNIFICANT CHANGE UP

## 2023-01-15 PROCEDURE — 99239 HOSP IP/OBS DSCHRG MGMT >30: CPT

## 2023-01-15 RX ADMIN — Medication 100 MILLIGRAM(S): at 05:29

## 2023-01-15 RX ADMIN — Medication 81 MILLIGRAM(S): at 12:25

## 2023-01-15 RX ADMIN — SPIRONOLACTONE 25 MILLIGRAM(S): 25 TABLET, FILM COATED ORAL at 05:30

## 2023-01-15 RX ADMIN — Medication 40 MILLIGRAM(S): at 05:30

## 2023-01-15 NOTE — DISCHARGE NOTE NURSING/CASE MANAGEMENT/SOCIAL WORK - PATIENT PORTAL LINK FT
You can access the FollowMyHealth Patient Portal offered by [facility] by registering at the following website: [email or web address]. By joining [employer]’s FollowMyHealth portal, you will also be able to view your health information using other applications (apps) compatible with our system.

## 2023-01-15 NOTE — DISCHARGE NOTE PROVIDER - NSDCMRMEDTOKEN_GEN_ALL_CORE_FT
amLODIPine 5 mg oral tablet: 1 tab(s) orally once a day  aspirin 81 mg oral delayed release tablet: 1 tab(s) orally once a day  atorvastatin 40 mg oral tablet: 1 tab(s) orally once a day  furosemide 40 mg oral tablet: 1 tab(s) orally once a day   guaiFENesin 100 mg/5 mL oral liquid: 5 milliliter(s) orally every 6 hours, As needed, Cough  isosorbide mononitrate 30 mg oral tablet, extended release: 1 tab(s) orally once a day (in the morning)  JARDIANCE 10 MG TABLET: 1 tab(s) orally once a day  Metoprolol Succinate ER 50 mg oral tablet, extended release: 1 tab(s) orally once a day  pantoprazole 40 mg oral delayed release tablet: 1 tab(s) orally once a day (before a meal)  rivaroxaban 15 mg oral tablet: 1 tab(s) orally once a day  spironolactone 25 mg oral tablet: 1 tab(s) orally once a day  theophylline 400 mg/24 hours oral tablet, extended release: 1 tab(s) orally once a day

## 2023-01-15 NOTE — DISCHARGE NOTE PROVIDER - HOSPITAL COURSE
HPI: The patient is a 89y Female PMH HTN, HLD, atrial fib, CHF, GERD presents to the ED for flu like sx since yesterday chest congestion, cough, runny nose, body aches, and had episode of labored breathing at home. Workup + Covid     Problem/Plan - 1:  ·  Problem: 2019 novel coronavirus disease (COVID-19).   ·  Plan: - cont isolation and supportive care. mucinex for cough prn. discussed     Problem/Plan - 2:  ·  Problem: CLAU (acute kidney injury).   ·  Plan: S/p hydration. Pending labs.     Problem/Plan - 3:  ·  Problem: paroxysmal Atrial fibrillation.   ·  Plan: Xarelto. rate controlled.     Problem/Plan - 4:  ·  Problem: HTN (hypertension).   ·  Plan: controlled. cont current management.    Problem/Plan - 5:  ·  Problem: Chronic CHF. euvolemic.   ·  Plan: Lasix  Jardiance non formulary.    Problem/Plan - 6:  ·  Problem: HLD (hyperlipidemia).   ·  Plan: lipitor.    paroxysmal a fib. cont rate control and AC. confirmed hx of a fib with daughter on phone    Seen and examined by me today. Vitals stable.   I have discussed all the inpatient radiographic findings with the patient's daughter on phone and stressed that patient follows with the PCP for further outpatient care. I have educated her to use [employer] patient portal (as instructed on the discharge paperwork) to access medical records outside the hospital.   All questions welcomed and answered appropriately. Patient's daughter verbalized understanding of post discharge physician's follows up and discharge instructions.   DC time spent by me excluding billable procedures 38 mins HPI: The patient is a 89y Female PMH HTN, HLD, atrial fib, CHF, GERD presents to the ED for flu like sx since yesterday chest congestion, cough, runny nose, body aches, and had episode of labored breathing at home. Workup + Covid     Problem/Plan - 1:  ·  Problem: 2019 novel coronavirus disease (COVID-19).   ·  Plan: - cont isolation and supportive care. mucinex for cough prn. discussed     Problem/Plan - 2:  ·  Problem: CLAU (acute kidney injury).   ·  Plan: S/p hydration. Pending labs.     Problem/Plan - 3:  ·  Problem: paroxysmal Atrial fibrillation.   ·  Plan: Xarelto. rate controlled.     Problem/Plan - 4:  ·  Problem: HTN (hypertension).   ·  Plan: controlled. cont current management.    Problem/Plan - 5:  ·  Problem: Chronic diastolic CHF. euvolemic.   ·  Plan: Lasix  Jardiance non formulary.    Problem/Plan - 6:  ·  Problem: HLD (hyperlipidemia).   ·  Plan: lipitor.    paroxysmal a fib. cont rate control and AC. confirmed hx of a fib with daughter on phone    Seen and examined by me today. Vitals stable.   I have discussed all the inpatient radiographic findings with the patient's daughter on phone and stressed that patient follows with the PCP for further outpatient care. I have educated her to use [employer] patient portal (as instructed on the discharge paperwork) to access medical records outside the hospital.   All questions welcomed and answered appropriately. Patient's daughter verbalized understanding of post discharge physician's follows up and discharge instructions.   DC time spent by me excluding billable procedures 38 mins

## 2023-01-15 NOTE — DISCHARGE NOTE PROVIDER - NSDCFUADDINST_GEN_ALL_CORE_FT
1) It is important to see your primary physician as well as other necessary consultants within next 7-10 days to perform a comprehensive medical review.  Call their offices for an appointment as soon as you leave the hospital.  If you do not have a primary physician or unable to reach your PCP, contact the [facility] Physician Referral Service at [phone].  Your medical issues appear to be stable at this time, but if your symptoms recur or worsen, contact your physicians and/or return to the hospital if necessary.  If you encounter any issues or questions with your medication, call your physicians before stopping the medication.    2) Please access [facility] Patient portal (as instructed on the discharge paperwork) to access your medical records at any time after discharge.     3) please continue your home medications without any change.     4) please check your weight daily and if gains > 2-3 pounds, call your PCP (discussed with daughter on the phone)    5) Use prn mucinex for cough.

## 2023-01-15 NOTE — DISCHARGE NOTE PROVIDER - NSDCCPCAREPLAN_GEN_ALL_CORE_FT
PRINCIPAL DISCHARGE DIAGNOSIS  Diagnosis: COVID  Assessment and Plan of Treatment:       SECONDARY DISCHARGE DIAGNOSES  Diagnosis: Elevated troponin  Assessment and Plan of Treatment:

## 2023-01-19 DIAGNOSIS — U07.1 COVID-19: ICD-10-CM

## 2023-01-19 DIAGNOSIS — I48.0 PAROXYSMAL ATRIAL FIBRILLATION: ICD-10-CM

## 2023-01-19 DIAGNOSIS — Z79.82 LONG TERM (CURRENT) USE OF ASPIRIN: ICD-10-CM

## 2023-01-19 DIAGNOSIS — Z96.653 PRESENCE OF ARTIFICIAL KNEE JOINT, BILATERAL: ICD-10-CM

## 2023-01-19 DIAGNOSIS — I24.8 OTHER FORMS OF ACUTE ISCHEMIC HEART DISEASE: ICD-10-CM

## 2023-01-19 DIAGNOSIS — M15.9 POLYOSTEOARTHRITIS, UNSPECIFIED: ICD-10-CM

## 2023-01-19 DIAGNOSIS — Z79.01 LONG TERM (CURRENT) USE OF ANTICOAGULANTS: ICD-10-CM

## 2023-01-19 DIAGNOSIS — I11.0 HYPERTENSIVE HEART DISEASE WITH HEART FAILURE: ICD-10-CM

## 2023-01-19 DIAGNOSIS — R05.9 COUGH, UNSPECIFIED: ICD-10-CM

## 2023-01-19 DIAGNOSIS — N17.9 ACUTE KIDNEY FAILURE, UNSPECIFIED: ICD-10-CM

## 2023-01-19 DIAGNOSIS — Z96.611 PRESENCE OF RIGHT ARTIFICIAL SHOULDER JOINT: ICD-10-CM

## 2023-01-19 DIAGNOSIS — K21.9 GASTRO-ESOPHAGEAL REFLUX DISEASE WITHOUT ESOPHAGITIS: ICD-10-CM

## 2023-01-19 DIAGNOSIS — E78.5 HYPERLIPIDEMIA, UNSPECIFIED: ICD-10-CM

## 2023-01-19 DIAGNOSIS — I50.32 CHRONIC DIASTOLIC (CONGESTIVE) HEART FAILURE: ICD-10-CM

## 2023-01-19 NOTE — H&P ADULT - NSHPPHYSICALEXAM_GEN_ALL_CORE
PHYSICAL EXAMINATION:  Vital Signs Last 24 Hrs  T(C): 36.6 (02 Apr 2022 15:55), Max: 36.8 (02 Apr 2022 13:36)  T(F): 97.8 (02 Apr 2022 15:55), Max: 98.3 (02 Apr 2022 13:36)  HR: 83 (02 Apr 2022 15:55) (83 - 86)  BP: 111/71 (02 Apr 2022 15:55) (108/66 - 111/71)  BP(mean): --  RR: 18 (02 Apr 2022 15:55) (18 - 22)  SpO2: 98% (02 Apr 2022 15:55) (98% - 100%)  CAPILLARY BLOOD GLUCOSE          GENERAL: NAD, well-groomed, well-developed  HEAD:  atraumatic, normocephalic  EYES: sclera anicteric  ENMT: mucous membranes moist  NECK: supple, No JVD  CHEST/LUNG: clear to auscultation bilaterally; no rales, rhonchi, or wheezing b/l  HEART: normal S1, S2  ABDOMEN: BS+, soft, ND, NT   EXTREMITIES:  pulses palpable; no clubbing, cyanosis, or edema b/l LEs  NEURO: awake, alert, interactive; moves all extremities  SKIN: no rashes or lesions PHYSICAL EXAMINATION:  Vital Signs Last 24 Hrs  T(C): 36.6 (02 Apr 2022 15:55), Max: 36.8 (02 Apr 2022 13:36)  T(F): 97.8 (02 Apr 2022 15:55), Max: 98.3 (02 Apr 2022 13:36)  HR: 83 (02 Apr 2022 15:55) (83 - 86)  BP: 111/71 (02 Apr 2022 15:55) (108/66 - 111/71)  BP(mean): --  RR: 18 (02 Apr 2022 15:55) (18 - 22)  SpO2: 98% (02 Apr 2022 15:55) (98% - 100%)  CAPILLARY BLOOD GLUCOSE          GENERAL: NAD, seen in ER,comfortable, no CP, fevers or SOB  HEAD:  atraumatic, normocephalic  EYES: sclera anicteric  ENMT: mucous membranes moist  NECK: supple, No JVD  CHEST/LUNG: clear to auscultation bilaterally; no rales, rhonchi, or wheezing b/l  HEART: normal S1, S2  ABDOMEN: BS+, soft, ND, NT   EXTREMITIES:  pulses palpable; no clubbing, cyanosis, trace edema b/l LEs  NEURO: awake, alert, interactive; moves all extremities  SKIN: no rashes or lesions Health Care Proxy (HCP)

## 2023-04-15 NOTE — ED PROVIDER NOTE - NS ED MD DISPO TELE ADMIT
I have personally evaluated and examined the patient. The Attending was available to me as a supervising provider if needed.
Continuous pulse ox monitoring

## 2023-05-19 NOTE — DISCHARGE NOTE PROVIDER - NSDCCPCAREPLAN_GEN_ALL_CORE_FT
Call Jefferson Roth at his new location 1 month prior to next due injection to start the process on getting this over there. PRINCIPAL DISCHARGE DIAGNOSIS  Diagnosis: Acute exacerbation of CHF (congestive heart failure)  Assessment and Plan of Treatment:

## 2024-03-27 NOTE — ED PROVIDER NOTE - CROS ED EYES ALL NEG
Number Of Photographs Reviewed: 13 Review Findings: no new or changing lesions Detail Level: Detailed negative...

## 2024-04-24 NOTE — PROVIDER CONTACT NOTE (CRITICAL VALUE NOTIFICATION) - DATE AND TIME:
Pt presents with reproducible substernal cp that worsens upon inspiration or movement for 3-4days, reports heavy lifting for work and takes aleve daily for back pain.      Patient identifiers verified by spelling and stated name on armband along with .     Review of patient's allergies indicates:  Review of patient's allergies indicates:  No Known Allergies    Patient updated on plan of care and changed into hospital gown.  Placed on continuous CM/SpO2/NIBP.  Bed locked and in low position with side rails up x2, call light within reach, family at bedside.     Will continue to monitor.   12-Sep-2021 09:30 12-Sep-2021 09:51

## 2025-01-16 NOTE — ED ADULT TRIAGE NOTE - SOURCE OF INFORMATION
Call placed to patient that office rec'ed fax that tadalafil is not covered by his insurance plan.  The price at [facility] would be $80.47 and at [facility] in [place] cost is $38.12.  Awaiting return call to see how patient would like to proceed.  Instructed patient to check the [facility] for pricing in his area.    EMS

## 2025-01-25 ENCOUNTER — EMERGENCY (EMERGENCY)
Facility: HOSPITAL | Age: 89
LOS: 0 days | Discharge: HOME | End: 2025-01-25
Attending: STUDENT IN AN ORGANIZED HEALTH CARE EDUCATION/TRAINING PROGRAM
Payer: MEDICARE

## 2025-01-25 VITALS
WEIGHT: 162.92 LBS | DIASTOLIC BLOOD PRESSURE: 77 MMHG | HEART RATE: 71 BPM | HEIGHT: 63 IN | SYSTOLIC BLOOD PRESSURE: 115 MMHG | TEMPERATURE: 98 F | OXYGEN SATURATION: 98 % | RESPIRATION RATE: 19 BRPM

## 2025-01-25 DIAGNOSIS — I11.0 HYPERTENSIVE HEART DISEASE WITH HEART FAILURE: ICD-10-CM

## 2025-01-25 DIAGNOSIS — E78.5 HYPERLIPIDEMIA, UNSPECIFIED: ICD-10-CM

## 2025-01-25 DIAGNOSIS — Z96.653 PRESENCE OF ARTIFICIAL KNEE JOINT, BILATERAL: Chronic | ICD-10-CM

## 2025-01-25 DIAGNOSIS — Z79.82 LONG TERM (CURRENT) USE OF ASPIRIN: ICD-10-CM

## 2025-01-25 DIAGNOSIS — R05.1 ACUTE COUGH: ICD-10-CM

## 2025-01-25 DIAGNOSIS — Z79.84 LONG TERM (CURRENT) USE OF ORAL HYPOGLYCEMIC DRUGS: ICD-10-CM

## 2025-01-25 DIAGNOSIS — R09.81 NASAL CONGESTION: ICD-10-CM

## 2025-01-25 DIAGNOSIS — Z79.01 LONG TERM (CURRENT) USE OF ANTICOAGULANTS: ICD-10-CM

## 2025-01-25 DIAGNOSIS — I50.9 HEART FAILURE, UNSPECIFIED: ICD-10-CM

## 2025-01-25 DIAGNOSIS — Z96.611 PRESENCE OF RIGHT ARTIFICIAL SHOULDER JOINT: Chronic | ICD-10-CM

## 2025-01-25 DIAGNOSIS — E11.9 TYPE 2 DIABETES MELLITUS WITHOUT COMPLICATIONS: ICD-10-CM

## 2025-01-25 DIAGNOSIS — I48.91 UNSPECIFIED ATRIAL FIBRILLATION: ICD-10-CM

## 2025-01-25 LAB
FLUAV AG NPH QL: SIGNIFICANT CHANGE UP
FLUBV AG NPH QL: SIGNIFICANT CHANGE UP
RSV RNA NPH QL NAA+NON-PROBE: SIGNIFICANT CHANGE UP
SARS-COV-2 RNA SPEC QL NAA+PROBE: SIGNIFICANT CHANGE UP

## 2025-01-25 PROCEDURE — 99284 EMERGENCY DEPT VISIT MOD MDM: CPT

## 2025-01-25 PROCEDURE — 71046 X-RAY EXAM CHEST 2 VIEWS: CPT | Mod: 26

## 2025-02-03 NOTE — H&P ADULT - NSTOBACCOSCREENHP_GEN_A_CS
JORGE LUIS HERE FOR FOLLOW UP & TX   Ran Cruz in 4 weeks with tx   MD VISIT: 3/3/25 @ 10:30AM TX @ 11AM   AVS PRINTED AND GIVEN ON EXIT   
No

## 2025-02-11 ENCOUNTER — EMERGENCY (EMERGENCY)
Facility: HOSPITAL | Age: 89
LOS: 0 days | Discharge: HOME | End: 2025-02-12
Attending: EMERGENCY MEDICINE
Payer: MEDICARE

## 2025-02-11 VITALS
HEART RATE: 70 BPM | RESPIRATION RATE: 18 BRPM | DIASTOLIC BLOOD PRESSURE: 76 MMHG | TEMPERATURE: 98 F | OXYGEN SATURATION: 99 % | HEIGHT: 63 IN | SYSTOLIC BLOOD PRESSURE: 130 MMHG | WEIGHT: 156.09 LBS

## 2025-02-11 DIAGNOSIS — Z96.611 PRESENCE OF RIGHT ARTIFICIAL SHOULDER JOINT: Chronic | ICD-10-CM

## 2025-02-11 DIAGNOSIS — M54.50 LOW BACK PAIN, UNSPECIFIED: ICD-10-CM
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
## 2025-02-11 DIAGNOSIS — S80.812A ABRASION, LEFT LOWER LEG, INITIAL ENCOUNTER: ICD-10-CM

## 2025-02-11 DIAGNOSIS — Z23 ENCOUNTER FOR IMMUNIZATION: ICD-10-CM

## 2025-02-11 DIAGNOSIS — Z79.01 LONG TERM (CURRENT) USE OF ANTICOAGULANTS: ICD-10-CM

## 2025-02-11 DIAGNOSIS — I48.91 UNSPECIFIED ATRIAL FIBRILLATION: ICD-10-CM
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
## 2025-02-11 DIAGNOSIS — S30.0XXA CONTUSION OF LOWER BACK AND PELVIS, INITIAL ENCOUNTER: ICD-10-CM

## 2025-02-11 DIAGNOSIS — Z96.653 PRESENCE OF ARTIFICIAL KNEE JOINT, BILATERAL: Chronic | ICD-10-CM

## 2025-02-11 LAB
ALBUMIN SERPL ELPH-MCNC: 3.8 G/DL — SIGNIFICANT CHANGE UP (ref 3.3–5)
ALP SERPL-CCNC: 124 U/L — HIGH (ref 40–120)
ALT FLD-CCNC: 18 U/L — SIGNIFICANT CHANGE UP (ref 12–78)
ANION GAP SERPL CALC-SCNC: 8 MMOL/L — SIGNIFICANT CHANGE UP (ref 5–17)
APPEARANCE UR: CLEAR — SIGNIFICANT CHANGE UP
APTT BLD: 38.6 SEC — HIGH (ref 24.5–35.6)
AST SERPL-CCNC: 25 U/L — SIGNIFICANT CHANGE UP (ref 15–37)
BASOPHILS # BLD AUTO: 0.02 K/UL — SIGNIFICANT CHANGE UP (ref 0–0.2)
BASOPHILS NFR BLD AUTO: 0.4 % — SIGNIFICANT CHANGE UP (ref 0–2)
BILIRUB SERPL-MCNC: 0.9 MG/DL — SIGNIFICANT CHANGE UP (ref 0.2–1.2)
BILIRUB UR-MCNC: NEGATIVE — SIGNIFICANT CHANGE UP
BUN SERPL-MCNC: 32 MG/DL — HIGH (ref 7–23)
CALCIUM SERPL-MCNC: 9.1 MG/DL — SIGNIFICANT CHANGE UP (ref 8.5–10.1)
CHLORIDE SERPL-SCNC: 103 MMOL/L — SIGNIFICANT CHANGE UP (ref 96–108)
CO2 SERPL-SCNC: 26 MMOL/L — SIGNIFICANT CHANGE UP (ref 22–31)
COLOR SPEC: YELLOW — SIGNIFICANT CHANGE UP
CREAT SERPL-MCNC: 1.15 MG/DL — SIGNIFICANT CHANGE UP (ref 0.5–1.3)
DIFF PNL FLD: NEGATIVE — SIGNIFICANT CHANGE UP
EGFR: 44 ML/MIN/1.73M2 — LOW
EOSINOPHIL # BLD AUTO: 0.07 K/UL — SIGNIFICANT CHANGE UP (ref 0–0.5)
EOSINOPHIL NFR BLD AUTO: 1.3 % — SIGNIFICANT CHANGE UP (ref 0–6)
GLUCOSE SERPL-MCNC: 78 MG/DL — SIGNIFICANT CHANGE UP (ref 70–99)
GLUCOSE UR QL: >=1000 MG/DL
HCT VFR BLD CALC: 40.2 % — SIGNIFICANT CHANGE UP (ref 34.5–45)
HGB BLD-MCNC: 13.1 G/DL — SIGNIFICANT CHANGE UP (ref 11.5–15.5)
IMM GRANULOCYTES NFR BLD AUTO: 0.4 % — SIGNIFICANT CHANGE UP (ref 0–0.9)
INR BLD: 2.56 RATIO — HIGH (ref 0.85–1.16)
KETONES UR-MCNC: NEGATIVE MG/DL — SIGNIFICANT CHANGE UP
LEUKOCYTE ESTERASE UR-ACNC: NEGATIVE — SIGNIFICANT CHANGE UP
LYMPHOCYTES # BLD AUTO: 1.32 K/UL — SIGNIFICANT CHANGE UP (ref 1–3.3)
LYMPHOCYTES # BLD AUTO: 23.6 % — SIGNIFICANT CHANGE UP (ref 13–44)
MCHC RBC-ENTMCNC: 32.6 G/DL — SIGNIFICANT CHANGE UP (ref 32–36)
MCHC RBC-ENTMCNC: 32.9 PG — SIGNIFICANT CHANGE UP (ref 27–34)
MCV RBC AUTO: 101 FL — HIGH (ref 80–100)
MONOCYTES # BLD AUTO: 0.51 K/UL — SIGNIFICANT CHANGE UP (ref 0–0.9)
MONOCYTES NFR BLD AUTO: 9.1 % — SIGNIFICANT CHANGE UP (ref 2–14)
NEUTROPHILS # BLD AUTO: 3.65 K/UL — SIGNIFICANT CHANGE UP (ref 1.8–7.4)
NEUTROPHILS NFR BLD AUTO: 65.2 % — SIGNIFICANT CHANGE UP (ref 43–77)
NITRITE UR-MCNC: NEGATIVE — SIGNIFICANT CHANGE UP
NRBC BLD AUTO-RTO: 0 /100 WBCS — SIGNIFICANT CHANGE UP (ref 0–0)
PH UR: 5.5 — SIGNIFICANT CHANGE UP (ref 5–8)
PLATELET # BLD AUTO: 205 K/UL — SIGNIFICANT CHANGE UP (ref 150–400)
POTASSIUM SERPL-MCNC: 4.3 MMOL/L — SIGNIFICANT CHANGE UP (ref 3.5–5.3)
POTASSIUM SERPL-SCNC: 4.3 MMOL/L — SIGNIFICANT CHANGE UP (ref 3.5–5.3)
PROT SERPL-MCNC: 7.5 GM/DL — SIGNIFICANT CHANGE UP (ref 6–8.3)
PROT UR-MCNC: NEGATIVE MG/DL — SIGNIFICANT CHANGE UP
PROTHROM AB SERPL-ACNC: 29.4 SEC — HIGH (ref 9.9–13.4)
RBC # BLD: 3.98 M/UL — SIGNIFICANT CHANGE UP (ref 3.8–5.2)
RBC # FLD: 13.1 % — SIGNIFICANT CHANGE UP (ref 10.3–14.5)
SODIUM SERPL-SCNC: 137 MMOL/L — SIGNIFICANT CHANGE UP (ref 135–145)
SP GR SPEC: 1.02 — SIGNIFICANT CHANGE UP (ref 1–1.03)
UROBILINOGEN FLD QL: 1 MG/DL — SIGNIFICANT CHANGE UP (ref 0.2–1)
WBC # BLD: 5.59 K/UL — SIGNIFICANT CHANGE UP (ref 3.8–10.5)
WBC # FLD AUTO: 5.59 K/UL — SIGNIFICANT CHANGE UP (ref 3.8–10.5)

## 2025-02-11 PROCEDURE — 72125 CT NECK SPINE W/O DYE: CPT | Mod: 26

## 2025-02-11 PROCEDURE — 70450 CT HEAD/BRAIN W/O DYE: CPT | Mod: 26

## 2025-02-11 PROCEDURE — 71260 CT THORAX DX C+: CPT | Mod: 26

## 2025-02-11 PROCEDURE — 99285 EMERGENCY DEPT VISIT HI MDM: CPT

## 2025-02-11 PROCEDURE — 93010 ELECTROCARDIOGRAM REPORT: CPT

## 2025-02-11 PROCEDURE — 74177 CT ABD & PELVIS W/CONTRAST: CPT | Mod: 26

## 2025-02-11 RX ORDER — ACETAMINOPHEN 160 MG/5ML
1000 SUSPENSION ORAL ONCE
Refills: 0 | Status: COMPLETED | OUTPATIENT
Start: 2025-02-11 | End: 2025-02-11

## 2025-02-11 RX ORDER — CLOSTRIDIUM TETANI TOXOID ANTIGEN (FORMALDEHYDE INACTIVATED), CORYNEBACTERIUM DIPHTHERIAE TOXOID ANTIGEN (FORMALDEHYDE INACTIVATED), BORDETELLA PERTUSSIS TOXOID ANTIGEN (GLUTARALDEHYDE INACTIVATED), BORDETELLA PERTUSSIS FILAMENTOUS HEMAGGLUTININ ANTIGEN (FORMALDEHYDE INACTIVATED), BORDETELLA PERTUSSIS PERTACTIN ANTIGEN, AND BORDETELLA PERTUSSIS FIMBRIAE 2/3 ANTIGEN 5; 2; 2.5; 5; 3; 5 [LF]/.5ML; [LF]/.5ML; UG/.5ML; UG/.5ML; UG/.5ML; UG/.5ML
0.5 INJECTION, SUSPENSION INTRAMUSCULAR ONCE
Refills: 0 | Status: COMPLETED | OUTPATIENT
Start: 2025-02-11 | End: 2025-02-11

## 2025-02-11 RX ADMIN — ACETAMINOPHEN 400 MILLIGRAM(S): 160 SUSPENSION ORAL at 21:30

## 2025-02-11 RX ADMIN — CLOSTRIDIUM TETANI TOXOID ANTIGEN (FORMALDEHYDE INACTIVATED), CORYNEBACTERIUM DIPHTHERIAE TOXOID ANTIGEN (FORMALDEHYDE INACTIVATED), BORDETELLA PERTUSSIS TOXOID ANTIGEN (GLUTARALDEHYDE INACTIVATED), BORDETELLA PERTUSSIS FILAMENTOUS HEMAGGLUTININ ANTIGEN (FORMALDEHYDE INACTIVATED), BORDETELLA PERTUSSIS PERTACTIN ANTIGEN, AND BORDETELLA PERTUSSIS FIMBRIAE 2/3 ANTIGEN 0.5 MILLILITER(S): 5; 2; 2.5; 5; 3; 5 INJECTION, SUSPENSION INTRAMUSCULAR at 22:00

## 2025-02-11 NOTE — ED ADULT TRIAGE NOTE - BP NONINVASIVE SYSTOLIC (MM HG)
Dysuria not responding to PO abx, found to have UTI and leukocytosis  -WBC downtrending 15s -> 13s  - C/w Ceftriaxone for at least 5 day course given failed PO treatment and leukocytosis  - Ucx +GNR, will tailor abx to speciation and sensitivities  - CXR clear lungs, procalcitonin not elevated - not pulm source of leukocytosis 130 Dysuria not responding to PO abx, found to have UTI and leukocytosis  -WBC downtrending 15 -> 13.74 -> 12.9  - C/w abx for at least 5 day course given failed PO treatment and leukocytosis  - CTX day 4/?  - Ucx +GNR  - CXR clear lungs, procalcitonin not elevated - not pulm source of leukocytosis Dysuria not responding to PO abx, found to have UTI and leukocytosis  -WBC downtrending 15 -> 13.74 -> 12.9  - C/w abx for at least 5 day course given failed PO treatment and leukocytosis  - Ucx +GNR  - CTX day 4, will transition patient to Vantin 100mg PO BID to complete 7-day course on discharge Dysuria not responding to PO abx, found to have UTI and leukocytosis  -WBC downtrending 15 -> 13.74 -> 12.9  - C/w abx for at least 5 day course given failed PO treatment and leukocytosis  - Ucx +Pseudomonas  - CTX day 4, switching to PO Levaquin with ucx results Dysuria not responding to PO abx, found to have UTI and leukocytosis  -WBC downtrending 15 -> 13.74 -> 12.9  - C/w abx for at least 5 day course given failed PO treatment and leukocytosis  - Ucx +Pseudomonas  - CTX day 4, switching to PO Cipro ER Dysuria not responding to PO abx, found to have UTI and leukocytosis  -WBC downtrending 15 -> 13.74 -> 12.9  - C/w abx for at least 5 day course given failed PO treatment and leukocytosis  - Ucx +Pseudomonas  - CTX day 4, switching to PO levofloxacin 750mg daily x 5 days

## 2025-02-11 NOTE — ED ADULT TRIAGE NOTE - CHIEF COMPLAINT QUOTE
fell on buttock while attempting  to sit on a  chair today. , no head injury, abrasion left shin. PMH of  Afib, heart failure, cholesterol on Xarelto.

## 2025-02-11 NOTE — ED PROVIDER NOTE - SKIN, MLM
Skin normal color for race, warm, dry. There is an abrasion on the distal left shin. There is a bruise on the left buttock.

## 2025-02-11 NOTE — ED PROVIDER NOTE - PATIENT PORTAL LINK FT
You can access the FollowMyHealth Patient Portal offered by [facility] by registering at the following website: [email or web address]. By joining [employer]’s FollowMyHealth portal, you will also be able to view your health information using other applications (apps) compatible with our system.

## 2025-02-11 NOTE — ED PROVIDER NOTE - MUSCULOSKELETAL, MLM
Spine appears normal, there is no c/t spinal tenderness, there is lower lumbar tenderness but no stepoffs. Range of motion is not limited, no muscle or joint tenderness or deformities.

## 2025-02-11 NOTE — ED PROVIDER NOTE - CLINICAL SUMMARY MEDICAL DECISION MAKING FREE TEXT BOX
89F s/p accidental fall at home  -cbc, cmp, coags, t&s, ecg  -ct head, cspine, c/a/p  -analgesia  -tdap

## 2025-02-11 NOTE — ED PROVIDER NOTE - PROGRESS NOTE DETAILS
the patients pain is improved, she has no tenderness of cervical spine and full active rom of spine and no trauma directly to the neck therefore fracture likely not acute. informed of incidental liver lesion and referred to primary care. - Reece ROSE

## 2025-02-11 NOTE — ED PROVIDER NOTE - OBJECTIVE STATEMENT
At about 8pm the patient states she was trying to sit down and missed the chair so she hit the ground on her buttocks. She did not hit her head nor lose consciousness. She is having pain in the left buttock and lower back and she has an abrasion on the left shin. She doesn't know when her last tetanus shot was. The patients granddaughter witnessed this and called her mother (patients daughter) who brought her to the ED for a CT scan because she was told that every time the patient falls she needs a CT scan because she is on xarelto for a fib. The last xarelto dose was at 1pm today.

## 2025-02-11 NOTE — ED ADULT NURSE NOTE - HOW OFTEN DO YOU HAVE A DRINK CONTAINING ALCOHOL?
[[email or web address]_OBGYN_ALL_OB_FT:[id number]],[NS_DeliveryRN_OBGYN_ALL_OB_FT:[id number]] Never

## 2025-02-11 NOTE — ED ADULT TRIAGE NOTE - OTHER COMPLAINTS
as per daughter, grand daughter reports seeing blood in urine when  patient used toilet after falling.( Reported after triage completed).

## 2025-02-11 NOTE — ED ADULT NURSE NOTE - OBJECTIVE STATEMENT
89Y A&OX3 F PMH Afib, HF, HLD c/o buttock and leg pain s/p fall today. per daughter at bedside, pt attempting to move to sit on chair and slipped. denies head injury. no LOC. abrasions noted to B/L LE and buttock. pt on xarelto

## 2025-02-11 NOTE — ED ADULT TRIAGE NOTE - MODE OF ARRIVAL
Walk in Opioid Counseling: I discussed with the patient the potential side effects of opioids including but not limited to addiction, altered mental status, and depression. I stressed avoiding alcohol, benzodiazepines, muscle relaxants and sleep aids unless specifically okayed by a physician. The patient verbalized understanding of the proper use and possible adverse effects of opioids. All of the patient's questions and concerns were addressed. They were instructed to flush the remaining pills down the toilet if they did not need them for pain.

## 2025-02-11 NOTE — ED PROVIDER NOTE - CARE PLAN
1 Principal Discharge DX:	Traumatic ecchymosis of buttock, initial encounter  Secondary Diagnosis:	Abrasion of left lower leg

## 2025-02-12 VITALS
SYSTOLIC BLOOD PRESSURE: 131 MMHG | DIASTOLIC BLOOD PRESSURE: 73 MMHG | HEART RATE: 70 BPM | OXYGEN SATURATION: 100 % | RESPIRATION RATE: 18 BRPM